# Patient Record
Sex: MALE | Race: WHITE | Employment: OTHER | URBAN - METROPOLITAN AREA
[De-identification: names, ages, dates, MRNs, and addresses within clinical notes are randomized per-mention and may not be internally consistent; named-entity substitution may affect disease eponyms.]

---

## 2017-05-11 PROCEDURE — 88305 TISSUE EXAM BY PATHOLOGIST: CPT | Performed by: INTERNAL MEDICINE

## 2017-05-18 ENCOUNTER — ANESTHESIA EVENT (OUTPATIENT)
Dept: SURGERY | Facility: HOSPITAL | Age: 62
End: 2017-05-18
Payer: COMMERCIAL

## 2017-05-18 ENCOUNTER — SURGERY (OUTPATIENT)
Age: 62
End: 2017-05-18

## 2017-05-18 ENCOUNTER — HOSPITAL ENCOUNTER (OUTPATIENT)
Facility: HOSPITAL | Age: 62
Setting detail: HOSPITAL OUTPATIENT SURGERY
Discharge: HOME | End: 2017-05-18
Attending: OTOLARYNGOLOGY | Admitting: OTOLARYNGOLOGY
Payer: COMMERCIAL

## 2017-05-18 ENCOUNTER — ANESTHESIA (OUTPATIENT)
Dept: SURGERY | Facility: HOSPITAL | Age: 62
End: 2017-05-18
Payer: COMMERCIAL

## 2017-05-18 VITALS
HEIGHT: 71 IN | SYSTOLIC BLOOD PRESSURE: 146 MMHG | RESPIRATION RATE: 16 BRPM | HEART RATE: 65 BPM | DIASTOLIC BLOOD PRESSURE: 75 MMHG | WEIGHT: 218 LBS | OXYGEN SATURATION: 97 % | BODY MASS INDEX: 30.52 KG/M2 | TEMPERATURE: 98 F

## 2017-05-18 DIAGNOSIS — H90.A31 MIXED CONDUCTIVE AND SENSORINEURAL HEARING LOSS OF RIGHT EAR WITH RESTRICTED HEARING OF LEFT EAR: ICD-10-CM

## 2017-05-18 DIAGNOSIS — H71.01 CHOLESTEATOMA OF ATTIC OF RIGHT EAR: ICD-10-CM

## 2017-05-18 PROBLEM — H90.71 MIXED HEARING LOSS OF RIGHT EAR: Chronic | Status: ACTIVE | Noted: 2017-05-18

## 2017-05-18 PROCEDURE — 88304 TISSUE EXAM BY PATHOLOGIST: CPT | Performed by: OTOLARYNGOLOGY

## 2017-05-18 PROCEDURE — 099500Z DRAINAGE OF RIGHT MIDDLE EAR WITH DRAINAGE DEVICE, OPEN APPROACH: ICD-10-PCS | Performed by: OTOLARYNGOLOGY

## 2017-05-18 PROCEDURE — 09R90JZ REPLACEMENT OF RIGHT AUDITORY OSSICLE WITH SYNTHETIC SUBSTITUTE, OPEN APPROACH: ICD-10-PCS | Performed by: OTOLARYNGOLOGY

## 2017-05-18 DEVICE — TUBE VENT RICHARDS 70241344: Type: IMPLANTABLE DEVICE | Site: EAR | Status: FUNCTIONAL

## 2017-05-18 RX ORDER — ONDANSETRON 2 MG/ML
INJECTION INTRAMUSCULAR; INTRAVENOUS
Status: COMPLETED
Start: 2017-05-18 | End: 2017-05-18

## 2017-05-18 RX ORDER — METOCLOPRAMIDE HYDROCHLORIDE 5 MG/ML
10 INJECTION INTRAMUSCULAR; INTRAVENOUS AS NEEDED
Status: DISCONTINUED | OUTPATIENT
Start: 2017-05-18 | End: 2017-05-18

## 2017-05-18 RX ORDER — ONDANSETRON 2 MG/ML
4 INJECTION INTRAMUSCULAR; INTRAVENOUS AS NEEDED
Status: DISCONTINUED | OUTPATIENT
Start: 2017-05-18 | End: 2017-05-18

## 2017-05-18 RX ORDER — NALOXONE HYDROCHLORIDE 0.4 MG/ML
80 INJECTION, SOLUTION INTRAMUSCULAR; INTRAVENOUS; SUBCUTANEOUS AS NEEDED
Status: DISCONTINUED | OUTPATIENT
Start: 2017-05-18 | End: 2017-05-18

## 2017-05-18 RX ORDER — HYDROCODONE BITARTRATE AND ACETAMINOPHEN 5; 325 MG/1; MG/1
1-2 TABLET ORAL EVERY 4 HOURS PRN
Qty: 40 TABLET | Refills: 0 | Status: SHIPPED | OUTPATIENT
Start: 2017-05-18 | End: 2017-12-19 | Stop reason: ALTCHOICE

## 2017-05-18 RX ORDER — HYDROMORPHONE HYDROCHLORIDE 1 MG/ML
0.4 INJECTION, SOLUTION INTRAMUSCULAR; INTRAVENOUS; SUBCUTANEOUS EVERY 5 MIN PRN
Status: DISCONTINUED | OUTPATIENT
Start: 2017-05-18 | End: 2017-05-18

## 2017-05-18 RX ORDER — DEXAMETHASONE SODIUM PHOSPHATE 4 MG/ML
4 VIAL (ML) INJECTION AS NEEDED
Status: DISCONTINUED | OUTPATIENT
Start: 2017-05-18 | End: 2017-05-18

## 2017-05-18 RX ORDER — SODIUM CHLORIDE, SODIUM LACTATE, POTASSIUM CHLORIDE, CALCIUM CHLORIDE 600; 310; 30; 20 MG/100ML; MG/100ML; MG/100ML; MG/100ML
INJECTION, SOLUTION INTRAVENOUS CONTINUOUS
Status: DISCONTINUED | OUTPATIENT
Start: 2017-05-18 | End: 2017-05-18

## 2017-05-18 RX ORDER — HYDRALAZINE HYDROCHLORIDE 20 MG/ML
5 INJECTION INTRAMUSCULAR; INTRAVENOUS EVERY 10 MIN PRN
Status: DISCONTINUED | OUTPATIENT
Start: 2017-05-18 | End: 2017-05-18

## 2017-05-18 RX ORDER — HYDROCODONE BITARTRATE AND ACETAMINOPHEN 5; 325 MG/1; MG/1
2 TABLET ORAL AS NEEDED
Status: COMPLETED | OUTPATIENT
Start: 2017-05-18 | End: 2017-05-18

## 2017-05-18 RX ORDER — IBUPROFEN 200 MG
400 TABLET ORAL EVERY 6 HOURS PRN
Qty: 30 TABLET | Refills: 0 | Status: SHIPPED | OUTPATIENT
Start: 2017-05-20 | End: 2017-12-19 | Stop reason: ALTCHOICE

## 2017-05-18 RX ORDER — METOCLOPRAMIDE HYDROCHLORIDE 5 MG/ML
INJECTION INTRAMUSCULAR; INTRAVENOUS
Status: COMPLETED
Start: 2017-05-18 | End: 2017-05-18

## 2017-05-18 RX ORDER — MEPERIDINE HYDROCHLORIDE 25 MG/ML
12.5 INJECTION INTRAMUSCULAR; INTRAVENOUS; SUBCUTANEOUS AS NEEDED
Status: DISCONTINUED | OUTPATIENT
Start: 2017-05-18 | End: 2017-05-18

## 2017-05-18 RX ORDER — MEPERIDINE HYDROCHLORIDE 25 MG/ML
INJECTION INTRAMUSCULAR; INTRAVENOUS; SUBCUTANEOUS
Status: COMPLETED
Start: 2017-05-18 | End: 2017-05-18

## 2017-05-18 RX ORDER — CIPROFLOXACIN AND DEXAMETHASONE 3; 1 MG/ML; MG/ML
SUSPENSION/ DROPS AURICULAR (OTIC) AS NEEDED
Status: DISCONTINUED | OUTPATIENT
Start: 2017-05-18 | End: 2017-05-18 | Stop reason: HOSPADM

## 2017-05-18 RX ORDER — HYDROMORPHONE HYDROCHLORIDE 1 MG/ML
INJECTION, SOLUTION INTRAMUSCULAR; INTRAVENOUS; SUBCUTANEOUS
Status: COMPLETED
Start: 2017-05-18 | End: 2017-05-18

## 2017-05-18 RX ORDER — LIDOCAINE HYDROCHLORIDE AND EPINEPHRINE 10; 10 MG/ML; UG/ML
INJECTION, SOLUTION INFILTRATION; PERINEURAL AS NEEDED
Status: DISCONTINUED | OUTPATIENT
Start: 2017-05-18 | End: 2017-05-18 | Stop reason: HOSPADM

## 2017-05-18 RX ORDER — HYDROCODONE BITARTRATE AND ACETAMINOPHEN 5; 325 MG/1; MG/1
1 TABLET ORAL AS NEEDED
Status: COMPLETED | OUTPATIENT
Start: 2017-05-18 | End: 2017-05-18

## 2017-05-18 RX ORDER — LABETALOL HYDROCHLORIDE 5 MG/ML
5 INJECTION, SOLUTION INTRAVENOUS EVERY 5 MIN PRN
Status: DISCONTINUED | OUTPATIENT
Start: 2017-05-18 | End: 2017-05-18

## 2017-05-18 NOTE — ANESTHESIA POSTPROCEDURE EVALUATION
[facility] Patient Status:  Hospital Outpatient Surgery   Age/Gender 59year old male MRN [id number]   [facility] SURGERY Attending Desiree Orlando MD   Hosp Day # 0 PCP Molly Ford MD       Anesthesia Post-op N

## 2017-05-18 NOTE — ANESTHESIA PREPROCEDURE EVALUATION
PRE-OP EVALUATION    Patient Name: Virginia Hermosillo    Pre-op Diagnosis: Cholesteatoma of attic of right ear [H71.01]  Mixed conductive and sensorineural hearing loss of right ear with restricted hearing of left ear [H90. A31]    Procedure(s):  Right tymp Neuro/Psych  Comment: Neuropathy                            Patient Active Problem List:     Erectile dysfunction     LBP radiating to left leg     Elevated cholesterol     BPH with obstruction/lower urinary tract symptoms     Primary localized osteoarthro dentition, multiple chipped/missing/carious teeth             Pulmonary    Pulmonary exam normal.  Breath sounds clear to auscultation bilaterally.     (-) rhonchi    (-) wheezes  (-) rales     Other findings            ASA: 2   Plan: general  NPO status ve

## 2017-05-18 NOTE — OPERATIVE REPORT
[facility]  Operative Report    Beba Harringotn Location: OR   CSN [id number] MRN [id number]   Admission Date 5/18/2017 Operation Date 5/18/2017   Attending Physician Jesse Noriega MD Operating Physician Stefan Ruggiero MD     Pre-Operative Joe Krause table. Anesthesia was administered via endotracheal intubation. The bed was turned 180 degrees and the patient was positioned with his right ear up.  The PrivacyProtector facial nerve monitor electrodes were placed and proper impedence and artifactual response wer stimulation. The incus was eroded with only a small superior remnant remaining which was removed. The posterior aspect of the cholesteatoma could not be identified so the decision was made to proceed with mastoidectomy.     A mastoidectomy was then complete procedure well and there were no apparent complications at the end of the procedure. Monique Head was transferred to the post anesthesia care unit in stable condition where his facial nerve was noted to be intact.      Karine Suggs  5/18/2017  10:32 AM

## 2017-05-18 NOTE — BRIEF OP NOTE
Pre-Operative Diagnosis: Cholesteatoma of attic of right ear [H71.01]  Mixed conductive and sensorineural hearing loss of right ear with restricted hearing of left ear [H90. A31]     Post-Operative Diagnosis: Cholesteatoma of attic of right ear [H71.01]M

## 2017-05-18 NOTE — INTERVAL H&P NOTE
Pre-op Diagnosis: Cholesteatoma of attic of right ear [H71.01]  Mixed conductive and sensorineural hearing loss of right ear with restricted hearing of left ear [H90. A31]    The above referenced H&P was reviewed by Justyn Reyes MD on 5/18/2017, the paul

## 2019-11-13 PROBLEM — S86.911A STRAIN OF BOTH KNEES: Status: ACTIVE | Noted: 2019-11-13

## 2019-11-13 PROBLEM — S86.912A STRAIN OF BOTH KNEES: Status: ACTIVE | Noted: 2019-11-13

## 2020-11-18 ENCOUNTER — HOSPITAL ENCOUNTER (INPATIENT)
Facility: HOSPITAL | Age: 65
LOS: 3 days | Discharge: HOME | DRG: 177 | End: 2020-11-22
Attending: STUDENT IN AN ORGANIZED HEALTH CARE EDUCATION/TRAINING PROGRAM | Admitting: INTERNAL MEDICINE
Payer: MEDICARE

## 2020-11-18 ENCOUNTER — APPOINTMENT (OUTPATIENT)
Dept: GENERAL RADIOLOGY | Facility: HOSPITAL | Age: 65
DRG: 177 | End: 2020-11-18
Attending: EMERGENCY MEDICINE
Payer: MEDICARE

## 2020-11-18 ENCOUNTER — APPOINTMENT (OUTPATIENT)
Dept: CT IMAGING | Facility: HOSPITAL | Age: 65
DRG: 177 | End: 2020-11-18
Attending: STUDENT IN AN ORGANIZED HEALTH CARE EDUCATION/TRAINING PROGRAM
Payer: MEDICARE

## 2020-11-18 DIAGNOSIS — U07.1 COVID-19 VIRUS INFECTION: Primary | ICD-10-CM

## 2020-11-18 DIAGNOSIS — R09.02 HYPOXIA: ICD-10-CM

## 2020-11-18 DIAGNOSIS — R52 BODY ACHES: ICD-10-CM

## 2020-11-18 DIAGNOSIS — R06.00 DYSPNEA, UNSPECIFIED TYPE: ICD-10-CM

## 2020-11-18 PROCEDURE — 83605 ASSAY OF LACTIC ACID: CPT

## 2020-11-18 PROCEDURE — 80053 COMPREHEN METABOLIC PANEL: CPT | Performed by: EMERGENCY MEDICINE

## 2020-11-18 PROCEDURE — 83880 ASSAY OF NATRIURETIC PEPTIDE: CPT | Performed by: EMERGENCY MEDICINE

## 2020-11-18 PROCEDURE — 99285 EMERGENCY DEPT VISIT HI MDM: CPT

## 2020-11-18 PROCEDURE — 87040 BLOOD CULTURE FOR BACTERIA: CPT

## 2020-11-18 PROCEDURE — 83605 ASSAY OF LACTIC ACID: CPT | Performed by: STUDENT IN AN ORGANIZED HEALTH CARE EDUCATION/TRAINING PROGRAM

## 2020-11-18 PROCEDURE — 3E0333Z INTRODUCTION OF ANTI-INFLAMMATORY INTO PERIPHERAL VEIN, PERCUTANEOUS APPROACH: ICD-10-PCS | Performed by: STUDENT IN AN ORGANIZED HEALTH CARE EDUCATION/TRAINING PROGRAM

## 2020-11-18 PROCEDURE — 81001 URINALYSIS AUTO W/SCOPE: CPT | Performed by: EMERGENCY MEDICINE

## 2020-11-18 PROCEDURE — 71275 CT ANGIOGRAPHY CHEST: CPT | Performed by: STUDENT IN AN ORGANIZED HEALTH CARE EDUCATION/TRAINING PROGRAM

## 2020-11-18 PROCEDURE — 93010 ELECTROCARDIOGRAM REPORT: CPT

## 2020-11-18 PROCEDURE — 84484 ASSAY OF TROPONIN QUANT: CPT | Performed by: EMERGENCY MEDICINE

## 2020-11-18 PROCEDURE — 84145 PROCALCITONIN (PCT): CPT | Performed by: EMERGENCY MEDICINE

## 2020-11-18 PROCEDURE — 71045 X-RAY EXAM CHEST 1 VIEW: CPT | Performed by: EMERGENCY MEDICINE

## 2020-11-18 PROCEDURE — 87040 BLOOD CULTURE FOR BACTERIA: CPT | Performed by: STUDENT IN AN ORGANIZED HEALTH CARE EDUCATION/TRAINING PROGRAM

## 2020-11-18 PROCEDURE — 96374 THER/PROPH/DIAG INJ IV PUSH: CPT

## 2020-11-18 PROCEDURE — 85025 COMPLETE CBC W/AUTO DIFF WBC: CPT | Performed by: EMERGENCY MEDICINE

## 2020-11-18 PROCEDURE — 85379 FIBRIN DEGRADATION QUANT: CPT | Performed by: EMERGENCY MEDICINE

## 2020-11-18 PROCEDURE — 36415 COLL VENOUS BLD VENIPUNCTURE: CPT

## 2020-11-18 PROCEDURE — 93005 ELECTROCARDIOGRAM TRACING: CPT

## 2020-11-18 RX ORDER — DEXAMETHASONE SODIUM PHOSPHATE 4 MG/ML
6 VIAL (ML) INJECTION ONCE
Status: COMPLETED | OUTPATIENT
Start: 2020-11-18 | End: 2020-11-18

## 2020-11-19 PROBLEM — R09.02 HYPOXIA: Status: ACTIVE | Noted: 2020-11-19

## 2020-11-19 PROBLEM — R52 BODY ACHES: Status: ACTIVE | Noted: 2020-11-19

## 2020-11-19 PROBLEM — R06.00 DYSPNEA, UNSPECIFIED TYPE: Status: ACTIVE | Noted: 2020-11-19

## 2020-11-19 PROCEDURE — 83615 LACTATE (LD) (LDH) ENZYME: CPT | Performed by: HOSPITALIST

## 2020-11-19 PROCEDURE — 86901 BLOOD TYPING SEROLOGIC RH(D): CPT | Performed by: INTERNAL MEDICINE

## 2020-11-19 PROCEDURE — 85379 FIBRIN DEGRADATION QUANT: CPT | Performed by: HOSPITALIST

## 2020-11-19 PROCEDURE — XW13325 TRANSFUSION OF CONVALESCENT PLASMA (NONAUTOLOGOUS) INTO PERIPHERAL VEIN, PERCUTANEOUS APPROACH, NEW TECHNOLOGY GROUP 5: ICD-10-PCS | Performed by: INTERNAL MEDICINE

## 2020-11-19 PROCEDURE — 86850 RBC ANTIBODY SCREEN: CPT | Performed by: INTERNAL MEDICINE

## 2020-11-19 PROCEDURE — 86900 BLOOD TYPING SEROLOGIC ABO: CPT | Performed by: INTERNAL MEDICINE

## 2020-11-19 PROCEDURE — 86927 PLASMA FRESH FROZEN: CPT

## 2020-11-19 PROCEDURE — 82728 ASSAY OF FERRITIN: CPT | Performed by: HOSPITALIST

## 2020-11-19 PROCEDURE — 85025 COMPLETE CBC W/AUTO DIFF WBC: CPT | Performed by: INTERNAL MEDICINE

## 2020-11-19 PROCEDURE — 36430 TRANSFUSION BLD/BLD COMPNT: CPT

## 2020-11-19 PROCEDURE — 86140 C-REACTIVE PROTEIN: CPT | Performed by: HOSPITALIST

## 2020-11-19 PROCEDURE — XW033E5 INTRODUCTION OF REMDESIVIR ANTI-INFECTIVE INTO PERIPHERAL VEIN, PERCUTANEOUS APPROACH, NEW TECHNOLOGY GROUP 5: ICD-10-PCS | Performed by: INTERNAL MEDICINE

## 2020-11-19 RX ORDER — ONDANSETRON 2 MG/ML
4 INJECTION INTRAMUSCULAR; INTRAVENOUS EVERY 6 HOURS PRN
Status: DISCONTINUED | OUTPATIENT
Start: 2020-11-19 | End: 2020-11-22

## 2020-11-19 RX ORDER — ENOXAPARIN SODIUM 100 MG/ML
40 INJECTION SUBCUTANEOUS DAILY
Status: DISCONTINUED | OUTPATIENT
Start: 2020-11-19 | End: 2020-11-22

## 2020-11-19 RX ORDER — SODIUM CHLORIDE 9 MG/ML
INJECTION, SOLUTION INTRAVENOUS ONCE
Status: COMPLETED | OUTPATIENT
Start: 2020-11-19 | End: 2020-11-19

## 2020-11-19 RX ORDER — METOCLOPRAMIDE HYDROCHLORIDE 5 MG/ML
10 INJECTION INTRAMUSCULAR; INTRAVENOUS EVERY 8 HOURS PRN
Status: DISCONTINUED | OUTPATIENT
Start: 2020-11-19 | End: 2020-11-22

## 2020-11-19 RX ORDER — FAMOTIDINE 20 MG/1
40 TABLET ORAL DAILY
Status: DISCONTINUED | OUTPATIENT
Start: 2020-11-19 | End: 2020-11-22

## 2020-11-19 RX ORDER — LISINOPRIL 10 MG/1
10 TABLET ORAL DAILY
Status: DISCONTINUED | OUTPATIENT
Start: 2020-11-19 | End: 2020-11-22

## 2020-11-19 RX ORDER — ACETAMINOPHEN 325 MG/1
650 TABLET ORAL EVERY 6 HOURS PRN
Status: DISCONTINUED | OUTPATIENT
Start: 2020-11-19 | End: 2020-11-22

## 2020-11-19 NOTE — PLAN OF CARE
Pt is aaox4, denies pain, received on O2 2l/nc, O2sat 96-98%, weaned to RA, O2sat 92-94%, states slight SOB with ambulation, afebrile, maintained on isol.   Pt remains on RA, O2sat 94%, still with SOB on ambulation, with new order for remdesivir and CCP, ex

## 2020-11-19 NOTE — CONSULTS
INFECTIOUS DISEASE CONSULT NOTE    Selina Harrington Patient Status:  Inpatient    1955 MRN [id number]   [facility] 4NW-A Attending Tessy Wood, [facility] Day # 0 PCP Bear Carranza MD       Reason for Consultation:  covid 19 in left   • KNEE REPLACEMENT SURGERY  12/5/13    Right TKA by Dr. Maddi Do   • [facility] Right 12/5/2013    Performed by Aileen Peres MD at [facility]   • [address] [facility] Left 10/24/2013    Performed by Aileen Peres MD at [facility] Radha Gruber pain  : Negative for dysuria, hematuria, frequency or flank pain  Integument: Negative for rash, skin lesions, pruritus. Hematologic/lymphatic: Negative for easy bruising, bleeding  Musculoskeletal: Negative for arthritis.   Neurological: Negative for fo (A) 11/11/2020       Pro-Calcitonin  Recent Labs   Lab 11/18/20  2143   PCT <0.05       Cardiac  Recent Labs   Lab 11/18/20  1143 11/18/20  2143   TROP <0.300 <0.045   PBNP  --  576*       Creatinine Kinase  Recent Labs   Lab 11/16/20  1009 11/18/20  0930 than 25% of lung parenchyma. PLEURA: No pleural effusions. No pneumothoraces. OSSEOUS STRUCTURES: No radiographically identifiable skeletal lesions. ABDOMEN: The visualized upper abdomen is grossly unremarkable. IMPRESSION: 1.  Mild multifocal groundglas nodes.  A more prominent on the right is measured at 1.8 x 1.1 cm. A more prominent on the left is measured at 1.5 x 1.1 cm. CARDIAC:  No pericardial effusion. PLEURA:  No pleural effusion. CHEST WALL:  No axillary adenopathy.  LIMITED ABDOMEN:  No abnorma extremity, as detailed above. 2. Rouleaux formation and flow seen within the central aspects of the superficial femoral vein. 3. Reactive inguinal nodes.     Ct Abdomen+pelvis(contrast Only)(cpt=74177)    Result Date: 11/18/2020  DATE OF SERVICE: 11.18.2020 noted. LYMPH NODES:  There is no abdominal or pelvic adenopathy. BODY WALL/OSSEOUS STRUCTURES:  Multilevel degenerative disc disease is noted. Stable lumbosacral fixation with interbody grafting is noted. No hardware complication is identified.     FAVIOLA exposure control and ALARA manual techniques for patient specific dose reduction were followed while maintaining the necessary diagnostic image quality. COMPARISON STUDY: CT chest dated 11/11/2020. CT abdomen and pelvis dated 3/16/2012.  FINDINGS: PULMONARY asymmetrically low-density focus within the anterior aspect of the right renal lower pole measuring 2.1 x 1.7 cm in axial diameter and 1.7 cm in craniocaudal diameter. This was not present on the prior comparison CT.  There is a 2 mm nonobstructing stone wi Consensus Statement on Reporting Chest CT Findings Related to COVID-19. Endorsed by the Society of Thoracic Radiology, the [employer] of Radiology, and Radiological Society of [place]       ASSESSMENT/ PLAN:    1.  Covid 19 infection with pna  -

## 2020-11-19 NOTE — ED PROVIDER NOTES
Patient Seen in: [facility] Emergency Department      History   Patient presents with:  Difficulty Breathing    Stated Complaint: covid + sob     HPI    Patient is a 58-year-old male with COVID-19 infection who presents to the emergency department t Right 5/18/2017    Performed by Tatyana Heart MD at [facility] MAIN OR   • EAR TYMPANOMASTOIDECTOMY Right 5/18/2017    Performed by Tatyana Heart MD at [facility] MAIN OR   • EAR TYMPANOPLASTY Right 5/18/2017    Performed by Tatyana Heart MD at [place] [address] Normocephalic and atraumatic. Eyes: Pupils are equal, round, and reactive to light. Neck: Normal range of motion. Neck supple. Cardiovascular: Normal rate, regular rhythm, normal heart sounds and intact distal pulses.   Exam reveals no gallop and no f DIFFERENTIAL WITH PLATELET.   Procedure                               Abnormality         Status                     ---------                               -----------         ------                     CBC W/ DIFFERENTIAL[[id number]]          Abnormal

## 2020-11-19 NOTE — H&P
[facility] Hospitalist Team  History and Physical       Assessment/Plan:     #Covid -19 PNA  -Steroids ordered as pt was hypoxic but now on RA  -ID consulted  -monitor inflammatory labs  -CT neg for PE, no evidence for bacterial PNA    #Acute hypoxic respiratory Right 5/18/2017    Performed by Reza Golden MD at [facility] MAIN OR   • EAR TYMPANOMASTOIDECTOMY Right 5/18/2017    Performed by Reza Golden MD at [facility] MAIN OR   • EAR TYMPANOPLASTY Right 5/18/2017    Performed by Reza Golden MD at [place] [address] (-) disorder of thought or mood  ENDOCRINE:  (-) heat or cold intolerance (-) polyuria (-) polydipsia  HEMATOLOGICAL:  (-) easy bruising (-) bleeding    OBJECTIVE:  /59 (BP Location: Right arm)   Pulse 69   Temp (!) 96.4 °F (35.8 °C) (Oral)   Resp 20 images of the chest were performed from the thoracic inlet through the adrenal glands without infusion of IV contrast, utilizing low-dose (LD) technique. The data was reconstructed into 1.25 mm collimated axial images.  Automated exposure control and ALARA College of Radiology, and Radiological Society of [place]. 2. Atherosclerotic changes of the thoracic aorta.       Ct Angiography, Chest (cpt=71275)    Result Date: 11/18/2020  PROCEDURE:  CT ANGIOGRAPHY, CHEST (CPT=71275)  COMPARISON:  RAGHU MANRIQUE, X MD on 11/18/2020 at 11:21 PM       Us Venous Doppler Leg Right - Diag Img (cpt=93971)    Result Date: 11/16/2020  DATE OF SERVICE: 11.16.2020 US VENOUS DOPPLER LEG RIGHT - DIAG IMG (CPT=93971) CLINICAL INDICATION:  Right calf pain.  COMPARISON:  None availa chest, abdomen and pelvis dated 11/16/2020 FINDINGS: LUNG BASES:  There is moderate peripheral nodular airspace disease compatible with Covid-19 similar to prior examination. The heart is stable in size. No pericardial effusion is identified.  LIVER, SPLEEN transcribed by Technologist)  Patient is COVID positive with worsening symptoms. FINDINGS:  Lung volumes are low normal.  Nonspecific elevation of the right diaphragm. There are mild patchy opacities at both lung bases. No definite pleural effusion. is in the right paratracheal station measuring 10 mm in short axis diameter. There are mildly enlarged hilar lymph nodes bilaterally measuring up to 13 x 9 mm on the right and 16 x 11 mm on the left. There is no significant axillary lymphadenopathy.  There 3.4 x 2.4 cm on the left. OSSEOUS STRUCTURES: 4 status post L4-S1 fusion with interbody spacer placements. Multilevel degenerative spondylosis. No destructive osseous abnormality. IMPRESSION: 1.  Redemonstrated multifocal groundglass pulmonary opacities, after discharge, patient will require TBD.

## 2020-11-20 PROCEDURE — 85379 FIBRIN DEGRADATION QUANT: CPT | Performed by: HOSPITALIST

## 2020-11-20 PROCEDURE — 86140 C-REACTIVE PROTEIN: CPT | Performed by: HOSPITALIST

## 2020-11-20 PROCEDURE — 82728 ASSAY OF FERRITIN: CPT | Performed by: HOSPITALIST

## 2020-11-20 PROCEDURE — 83615 LACTATE (LD) (LDH) ENZYME: CPT | Performed by: HOSPITALIST

## 2020-11-20 PROCEDURE — 80053 COMPREHEN METABOLIC PANEL: CPT | Performed by: INTERNAL MEDICINE

## 2020-11-20 RX ORDER — SODIUM CHLORIDE/ALOE VERA
GEL (GRAM) NASAL AS NEEDED
Status: DISCONTINUED | OUTPATIENT
Start: 2020-11-20 | End: 2020-11-22

## 2020-11-20 NOTE — PROGRESS NOTES
[facility] Hospitalist Team  Progress Note      Nely Harrington  7/2/1955    Assessment/Plan:       #Covid -19 PNA  -Steroids ordered   -ID consulted  -monitor inflammatory labs  -CT neg for PE, no evidence for bacterial PNA  -remdesivir ordered    #Acute hypo Oral Daily   • lisinopril  10 mg Oral Daily   • remdesivir IVPB  100 mg Intravenous Q24H     Continuous Infusions:   PRN: sodium chloride 0.9%, acetaminophen, ondansetron HCl, Metoclopramide HCl       Principal Problem:    COVID-19 virus infection  Active

## 2020-11-20 NOTE — PROGRESS NOTES
INFECTIOUS DISEASE PROGRESS NOTE    Lisset Villafuerte Len Patient Status:  Inpatient    1955 MRN [id number]   [facility] 4NW-A Attending Erik Humphreys, [facility] Day # 1 PCP MD FRANCI Figueroa D#2 CCP   DEXA    Subj --  112 103   GFRNAA  --  97 89   CA  --  8.3* 9.0   ALB 3.7 2.8* 2.9*    133* 137   K 3.80 3.7 3.1*   CL 99 101 105   CO2 25.7 24.0 28.0   ALKPHO 54 44* 49   AST 52* 49* 43*   ALT 19 22 25   BILT 0.44 0.5 0.4   TP 7.5 7.1 7.4     No results found fo follow    Cristiane Alfonso

## 2020-11-20 NOTE — PLAN OF CARE
Pt A/O x4. Vitals stable. Afebrile. No c/o pain. Received pt on RA, saturations in mid to high 90's. Pt c/o slight dyspnea on exertion. Pt received day 1 of Remdesivir and convalescent plasma. Pt tolerated well. Non-productive cough.  Ambulatory to bathroom

## 2020-11-20 NOTE — CM/SW NOTE
Patient discussed during rounds. Patient is currently on 2L02 from room air yesterday. No dc today. SW will watch for 02 needs.     Kathy Magana LCSW

## 2020-11-20 NOTE — PLAN OF CARE
Assumed patient care at 7:30  A/O x4  2L (will continue to try and wean the patient O2)  NSR on tele   Voids per bathroom  No pain noted by the patient  Regular diet   Right AC SL   Electrolyte protocol  All safety precautions are in place and will continu

## 2020-11-21 PROCEDURE — 86140 C-REACTIVE PROTEIN: CPT | Performed by: HOSPITALIST

## 2020-11-21 PROCEDURE — 83615 LACTATE (LD) (LDH) ENZYME: CPT | Performed by: HOSPITALIST

## 2020-11-21 PROCEDURE — 85025 COMPLETE CBC W/AUTO DIFF WBC: CPT | Performed by: INTERNAL MEDICINE

## 2020-11-21 PROCEDURE — 85379 FIBRIN DEGRADATION QUANT: CPT | Performed by: HOSPITALIST

## 2020-11-21 PROCEDURE — 82728 ASSAY OF FERRITIN: CPT | Performed by: HOSPITALIST

## 2020-11-21 PROCEDURE — 80053 COMPREHEN METABOLIC PANEL: CPT | Performed by: INTERNAL MEDICINE

## 2020-11-21 NOTE — PLAN OF CARE
.. COVID Note    Subjective:  Bard Flattery is currently Confirmed for COVID-19.   Presenting symptoms: cough and shortness of breath  he denies     Max temperature in last 24 hours: Temp (24hrs), Av.9 °F (36.1 °C), Min:93.9 °F (34.4 °C), Max:98.3 °

## 2020-11-21 NOTE — PROGRESS NOTES
INFECTIOUS DISEASE PROGRESS NOTE    Tegan Harrington Patient Status:  Inpatient    1955 MRN [id number]   [facility] 4NW-A Attending Lacy Luque, [facility] Day # 2 PCP MD FRANCI Colindres D#3 CCP   DEXA    Subj 92   CA 8.3* 9.0 9.4   ALB 2.8* 2.9* 2.9*   * 137 137   K 3.7 3.1* 3.7    105 104   CO2 24.0 28.0 28.0   ALKPHO 44* 49 46   AST 49* 43* 42*   ALT 22 25 27   BILT 0.5 0.4 0.4   TP 7.1 7.4 7.4     No results found for: ESRML   C-REACTIVE PROTEIN, remdesivir for today, if he remains off o2 and O2 with ambulation Ok, may be able to go home tomorrow to complete 10 day course of dexa.     Ronit Rand

## 2020-11-21 NOTE — PROGRESS NOTES
[facility] Hospitalist Team  Progress Note      Nely Harrington  7/2/1955    Assessment/Plan:       #Covid -19 PNA  -Steroids ordered   -ID consulted  -monitor inflammatory labs  -CT neg for PE, no evidence for bacterial PNA  -remdesivir day 3    #Acute hypoxi PGLU in the last 168 hours.     Meds:   Scheduled:   • enoxaparin  40 mg Subcutaneous Daily   • dexamethasone  6 mg Oral Daily   • famotidine  40 mg Oral Daily   • lisinopril  10 mg Oral Daily   • remdesivir IVPB  100 mg Intravenous Q24H     Continuous Infu

## 2020-11-22 VITALS
DIASTOLIC BLOOD PRESSURE: 76 MMHG | BODY MASS INDEX: 29.64 KG/M2 | HEIGHT: 71 IN | TEMPERATURE: 98 F | OXYGEN SATURATION: 95 % | SYSTOLIC BLOOD PRESSURE: 131 MMHG | RESPIRATION RATE: 24 BRPM | WEIGHT: 211.69 LBS | HEART RATE: 67 BPM

## 2020-11-22 PROCEDURE — 80053 COMPREHEN METABOLIC PANEL: CPT | Performed by: INTERNAL MEDICINE

## 2020-11-22 PROCEDURE — 85025 COMPLETE CBC W/AUTO DIFF WBC: CPT | Performed by: INTERNAL MEDICINE

## 2020-11-22 RX ORDER — DEXAMETHASONE 6 MG/1
6 TABLET ORAL DAILY
Qty: 6 TABLET | Refills: 0 | Status: SHIPPED | OUTPATIENT
Start: 2020-11-23 | End: 2020-12-17

## 2020-11-22 NOTE — PLAN OF CARE
NURSING DISCHARGE NOTE    Discharged Home via Wheelchair. Accompanied by Support staff  Belongings Taken by patient/family. Tele removed. PIV removed. Discharge paper work reviewed with patient, patient verbalized understanding.

## 2020-11-22 NOTE — DISCHARGE SUMMARY
General Medicine Discharge Summary     Patient ID:  Mikaela Harrington  77year old  7/2/1955    Admit date: 11/18/2020    Discharge date and time: 11/22/20    Attending Physician: Tano Buckner, [place]) Oral Cap  Take 1 tablet by mouth daily. Sildenafil Citrate 100 MG Oral Tab  Take 100 mg by mouth daily as needed for Erectile Dysfunction. atorvastatin 20 MG Oral Tab  Take 20 mg by mouth nightly.     psyllium (METAMUCIL SMOOTH TEXTURE) 28 % Oral

## 2020-11-22 NOTE — COVID NURSING ASSESSMENT
COVID-19 Daily Discharge Readiness-Nursing    O2 Sat at Rest:     %   O2 Sat with Exertion:    % on    liters   Temperature max from last 24 hrs: Temp (24hrs), Av °F (36.7 °C), Min:97.6 °F (36.4 °C), Max:98.6 °F (37 °C)    Inflammatory Markers:   Recen

## 2021-01-08 RX ORDER — ACETAMINOPHEN 500 MG
1000 TABLET ORAL ONCE
Status: CANCELLED | OUTPATIENT
Start: 2021-01-08 | End: 2021-01-08

## 2021-01-08 RX ORDER — MULTIVIT WITH MINERALS/LUTEIN
1000 TABLET ORAL DAILY
COMMUNITY

## 2021-01-08 RX ORDER — CHOLECALCIFEROL (VITAMIN D3) 125 MCG
CAPSULE ORAL DAILY
COMMUNITY

## 2021-01-09 ENCOUNTER — HOSPITAL ENCOUNTER (OUTPATIENT)
Dept: GENERAL RADIOLOGY | Age: 66
Discharge: HOME | End: 2021-01-09
Payer: MEDICARE

## 2021-01-09 DIAGNOSIS — K40.90 INGUINAL HERNIA OF LEFT SIDE WITHOUT OBSTRUCTION OR GANGRENE: ICD-10-CM

## 2021-01-09 PROCEDURE — 71046 X-RAY EXAM CHEST 2 VIEWS: CPT

## 2021-01-12 ENCOUNTER — ANESTHESIA EVENT (OUTPATIENT)
Dept: SURGERY | Facility: HOSPITAL | Age: 66
End: 2021-01-12
Payer: MEDICARE

## 2021-01-21 ENCOUNTER — ANESTHESIA (OUTPATIENT)
Dept: SURGERY | Facility: HOSPITAL | Age: 66
End: 2021-01-21
Payer: MEDICARE

## 2021-01-21 ENCOUNTER — HOSPITAL ENCOUNTER (OUTPATIENT)
Facility: HOSPITAL | Age: 66
Setting detail: HOSPITAL OUTPATIENT SURGERY
Discharge: HOME | End: 2021-01-21
Attending: SURGERY | Admitting: SURGERY
Payer: MEDICARE

## 2021-01-21 VITALS
SYSTOLIC BLOOD PRESSURE: 115 MMHG | RESPIRATION RATE: 18 BRPM | BODY MASS INDEX: 31.18 KG/M2 | WEIGHT: 222.69 LBS | TEMPERATURE: 97 F | HEIGHT: 71 IN | DIASTOLIC BLOOD PRESSURE: 64 MMHG | OXYGEN SATURATION: 95 % | HEART RATE: 72 BPM

## 2021-01-21 DIAGNOSIS — K40.90 INGUINAL HERNIA OF LEFT SIDE WITHOUT OBSTRUCTION OR GANGRENE: Primary | ICD-10-CM

## 2021-01-21 DIAGNOSIS — K40.90 INGUINAL HERNIA, RIGHT: ICD-10-CM

## 2021-01-21 PROCEDURE — 0YU54JZ SUPPLEMENT RIGHT INGUINAL REGION WITH SYNTHETIC SUBSTITUTE, PERCUTANEOUS ENDOSCOPIC APPROACH: ICD-10-PCS | Performed by: SURGERY

## 2021-01-21 PROCEDURE — 8E0W4CZ ROBOTIC ASSISTED PROCEDURE OF TRUNK REGION, PERCUTANEOUS ENDOSCOPIC APPROACH: ICD-10-PCS | Performed by: SURGERY

## 2021-01-21 DEVICE — PROGRIP MESH: Type: IMPLANTABLE DEVICE | Site: INGUINAL | Status: FUNCTIONAL

## 2021-01-21 RX ORDER — ROCURONIUM BROMIDE 10 MG/ML
INJECTION, SOLUTION INTRAVENOUS AS NEEDED
Status: DISCONTINUED | OUTPATIENT
Start: 2021-01-21 | End: 2021-01-21 | Stop reason: SURG

## 2021-01-21 RX ORDER — LIDOCAINE HYDROCHLORIDE 10 MG/ML
INJECTION, SOLUTION INFILTRATION; PERINEURAL AS NEEDED
Status: DISCONTINUED | OUTPATIENT
Start: 2021-01-21 | End: 2021-01-21 | Stop reason: HOSPADM

## 2021-01-21 RX ORDER — GLYCOPYRROLATE 0.2 MG/ML
INJECTION, SOLUTION INTRAMUSCULAR; INTRAVENOUS AS NEEDED
Status: DISCONTINUED | OUTPATIENT
Start: 2021-01-21 | End: 2021-01-21 | Stop reason: SURG

## 2021-01-21 RX ORDER — NALOXONE HYDROCHLORIDE 0.4 MG/ML
80 INJECTION, SOLUTION INTRAMUSCULAR; INTRAVENOUS; SUBCUTANEOUS AS NEEDED
Status: DISCONTINUED | OUTPATIENT
Start: 2021-01-21 | End: 2021-01-21

## 2021-01-21 RX ORDER — HYDROCODONE BITARTRATE AND ACETAMINOPHEN 5; 325 MG/1; MG/1
1 TABLET ORAL AS NEEDED
Status: COMPLETED | OUTPATIENT
Start: 2021-01-21 | End: 2021-01-21

## 2021-01-21 RX ORDER — ONDANSETRON 2 MG/ML
4 INJECTION INTRAMUSCULAR; INTRAVENOUS AS NEEDED
Status: DISCONTINUED | OUTPATIENT
Start: 2021-01-21 | End: 2021-01-21

## 2021-01-21 RX ORDER — BUPIVACAINE HYDROCHLORIDE 5 MG/ML
INJECTION, SOLUTION EPIDURAL; INTRACAUDAL AS NEEDED
Status: DISCONTINUED | OUTPATIENT
Start: 2021-01-21 | End: 2021-01-21 | Stop reason: HOSPADM

## 2021-01-21 RX ORDER — SODIUM CHLORIDE, SODIUM LACTATE, POTASSIUM CHLORIDE, CALCIUM CHLORIDE 600; 310; 30; 20 MG/100ML; MG/100ML; MG/100ML; MG/100ML
INJECTION, SOLUTION INTRAVENOUS CONTINUOUS
Status: DISCONTINUED | OUTPATIENT
Start: 2021-01-21 | End: 2021-01-21

## 2021-01-21 RX ORDER — HYDROCODONE BITARTRATE AND ACETAMINOPHEN 5; 325 MG/1; MG/1
2 TABLET ORAL AS NEEDED
Status: COMPLETED | OUTPATIENT
Start: 2021-01-21 | End: 2021-01-21

## 2021-01-21 RX ORDER — IBUPROFEN 800 MG/1
800 TABLET ORAL EVERY 8 HOURS PRN
Qty: 24 TABLET | Refills: 0 | Status: SHIPPED | OUTPATIENT
Start: 2021-01-21 | End: 2021-03-22

## 2021-01-21 RX ORDER — METOCLOPRAMIDE HYDROCHLORIDE 5 MG/ML
10 INJECTION INTRAMUSCULAR; INTRAVENOUS AS NEEDED
Status: DISCONTINUED | OUTPATIENT
Start: 2021-01-21 | End: 2021-01-21

## 2021-01-21 RX ORDER — DEXAMETHASONE SODIUM PHOSPHATE 4 MG/ML
VIAL (ML) INJECTION AS NEEDED
Status: DISCONTINUED | OUTPATIENT
Start: 2021-01-21 | End: 2021-01-21 | Stop reason: SURG

## 2021-01-21 RX ORDER — HYDROMORPHONE HYDROCHLORIDE 1 MG/ML
INJECTION, SOLUTION INTRAMUSCULAR; INTRAVENOUS; SUBCUTANEOUS
Status: COMPLETED
Start: 2021-01-21 | End: 2021-01-21

## 2021-01-21 RX ORDER — ACETAMINOPHEN 500 MG
1000 TABLET ORAL ONCE AS NEEDED
Status: DISCONTINUED | OUTPATIENT
Start: 2021-01-21 | End: 2021-01-21

## 2021-01-21 RX ORDER — EPHEDRINE SULFATE 50 MG/ML
INJECTION INTRAVENOUS AS NEEDED
Status: DISCONTINUED | OUTPATIENT
Start: 2021-01-21 | End: 2021-01-21 | Stop reason: SURG

## 2021-01-21 RX ORDER — MIDAZOLAM HYDROCHLORIDE 1 MG/ML
INJECTION INTRAMUSCULAR; INTRAVENOUS AS NEEDED
Status: DISCONTINUED | OUTPATIENT
Start: 2021-01-21 | End: 2021-01-21 | Stop reason: SURG

## 2021-01-21 RX ORDER — ONDANSETRON 2 MG/ML
INJECTION INTRAMUSCULAR; INTRAVENOUS AS NEEDED
Status: DISCONTINUED | OUTPATIENT
Start: 2021-01-21 | End: 2021-01-21 | Stop reason: SURG

## 2021-01-21 RX ORDER — CEFAZOLIN SODIUM/WATER 2 G/20 ML
2 SYRINGE (ML) INTRAVENOUS ONCE
Status: COMPLETED | OUTPATIENT
Start: 2021-01-21 | End: 2021-01-21

## 2021-01-21 RX ORDER — KETOROLAC TROMETHAMINE 30 MG/ML
INJECTION, SOLUTION INTRAMUSCULAR; INTRAVENOUS AS NEEDED
Status: DISCONTINUED | OUTPATIENT
Start: 2021-01-21 | End: 2021-01-21 | Stop reason: SURG

## 2021-01-21 RX ORDER — NEOSTIGMINE METHYLSULFATE 1 MG/ML
INJECTION INTRAVENOUS AS NEEDED
Status: DISCONTINUED | OUTPATIENT
Start: 2021-01-21 | End: 2021-01-21 | Stop reason: SURG

## 2021-01-21 RX ORDER — ACETAMINOPHEN 500 MG
1000 TABLET ORAL ONCE
COMMUNITY
End: 2021-07-22 | Stop reason: ALTCHOICE

## 2021-01-21 RX ORDER — LIDOCAINE HYDROCHLORIDE 10 MG/ML
INJECTION, SOLUTION EPIDURAL; INFILTRATION; INTRACAUDAL; PERINEURAL AS NEEDED
Status: DISCONTINUED | OUTPATIENT
Start: 2021-01-21 | End: 2021-01-21 | Stop reason: SURG

## 2021-01-21 RX ORDER — HYDROMORPHONE HYDROCHLORIDE 1 MG/ML
0.4 INJECTION, SOLUTION INTRAMUSCULAR; INTRAVENOUS; SUBCUTANEOUS EVERY 5 MIN PRN
Status: DISCONTINUED | OUTPATIENT
Start: 2021-01-21 | End: 2021-01-21

## 2021-01-21 RX ADMIN — NEOSTIGMINE METHYLSULFATE 5 MG: 1 INJECTION INTRAVENOUS at 08:54:00

## 2021-01-21 RX ADMIN — CEFAZOLIN SODIUM/WATER 2 G: 2 G/20 ML SYRINGE (ML) INTRAVENOUS at 07:52:00

## 2021-01-21 RX ADMIN — EPHEDRINE SULFATE 5 MG: 50 INJECTION INTRAVENOUS at 08:20:00

## 2021-01-21 RX ADMIN — DEXAMETHASONE SODIUM PHOSPHATE 4 MG: 4 MG/ML VIAL (ML) INJECTION at 07:44:00

## 2021-01-21 RX ADMIN — EPHEDRINE SULFATE 5 MG: 50 INJECTION INTRAVENOUS at 08:25:00

## 2021-01-21 RX ADMIN — SODIUM CHLORIDE, SODIUM LACTATE, POTASSIUM CHLORIDE, CALCIUM CHLORIDE: 600; 310; 30; 20 INJECTION, SOLUTION INTRAVENOUS at 08:34:00

## 2021-01-21 RX ADMIN — ROCURONIUM BROMIDE 50 MG: 10 INJECTION, SOLUTION INTRAVENOUS at 07:44:00

## 2021-01-21 RX ADMIN — LIDOCAINE HYDROCHLORIDE 50 MG: 10 INJECTION, SOLUTION EPIDURAL; INFILTRATION; INTRACAUDAL; PERINEURAL at 07:44:00

## 2021-01-21 RX ADMIN — MIDAZOLAM HYDROCHLORIDE 2 MG: 1 INJECTION INTRAMUSCULAR; INTRAVENOUS at 07:43:00

## 2021-01-21 RX ADMIN — GLYCOPYRROLATE 0.8 MG: 0.2 INJECTION, SOLUTION INTRAMUSCULAR; INTRAVENOUS at 08:54:00

## 2021-01-21 RX ADMIN — ONDANSETRON 4 MG: 2 INJECTION INTRAMUSCULAR; INTRAVENOUS at 08:52:00

## 2021-01-21 RX ADMIN — SODIUM CHLORIDE, SODIUM LACTATE, POTASSIUM CHLORIDE, CALCIUM CHLORIDE: 600; 310; 30; 20 INJECTION, SOLUTION INTRAVENOUS at 09:01:00

## 2021-01-21 RX ADMIN — KETOROLAC TROMETHAMINE 30 MG: 30 INJECTION, SOLUTION INTRAMUSCULAR; INTRAVENOUS at 08:52:00

## 2021-01-21 NOTE — ANESTHESIA PROCEDURE NOTES
Airway  Date/Time: 1/21/2021 7:46 AM  Urgency: elective    Airway not difficult    General Information and Staff    Patient location during procedure: OR  Anesthesiologist: Gris Cool MD  Resident/CRNA: Gilbert Hewitt CRNA  Performed: CRNA     Ind

## 2021-01-21 NOTE — H&P
Renata Nelson is a 77year old male. Patient was referred from No ref. provider found       Patient presents for complaints of pain and a bulge in the RIGHT inguinal region. he recently underwent a CT scan of the abdomen which confirmed this herni MAIN OR   • KNEE REPLACEMENT SURGERY  10/2013    left   • KNEE REPLACEMENT SURGERY  12/5/13    Right TKA by Dr. Justin Lopez   • [address] Right 12/5/2013    Performed by Rocio Ballard MD at [facility] MAIN OR   • [address] Left 10/24/201 no apparent distress  SKIN: anicteric  EYES: PERRLA, EOMI, sclera anicteric  HEENT: normocephalic; normal nose  NECK: supple, no JVD  RESPIRATORY: clear to percussion and auscultation  CARDIOVASCULAR: RRR, murmur negative  ABDOMEN: normal active BS, soft,

## 2021-01-21 NOTE — ANESTHESIA PREPROCEDURE EVALUATION
PRE-OP EVALUATION    Patient Name: Kallie Harrington    Pre-op Diagnosis: Inguinal hernia, right [K40.90]    Procedure(s):  XI ROBOT-ASSISTED LAPAROSCOPIC REPAIR RIGHT INGUINAL HERNIA WITH MESH    Surgeon(s) and Role:     Franny Garcia MD - [facility] 2-2012, [place]    diverticulitis, divertic cyst, Dr Chalo Bustillo Right 5/18/2017    Performed by Tiana Morris MD at [facility] MAIN OR   • EAR TYMPANOMASTOIDECTOMY Right 5/18/2017    Performed by Tiana Morris MD at [facility] MAIN 11/18/2020    .0 11/22/2020     11/18/2020     Lab Results   Component Value Date     11/22/2020     11/18/2020    K 3.5 11/22/2020    K 3.80 11/18/2020     11/22/2020    CL 99 11/18/2020    CO2 25.0 11/22/2020    CO2 25.7 1

## 2021-01-21 NOTE — OPERATIVE REPORT
[address]                                                         Operative Note    Natividad Harrington Location: [address] Perioperative   CSN [id number] MRN [id number]   Admission Date 1/21/2021 Procedure Date 1/21/2021   Attending Physician Shelli Fontanez coverage of the direct indirect and lateral space. Once this was accomplished the peritoneum was brought back up into position and reapproximated with a 3-0 V. LOC suture. Hemostasis was achieved.  A bilateral transabdominal plane (TAP) was performed by inf

## 2021-01-21 NOTE — ANESTHESIA POSTPROCEDURE EVALUATION
[address] Harrington Patient Status:  Hospital Outpatient Surgery   Age/Gender 77year old male MRN [id number]   [facility] SURGERY Attending Corrina Jordan MD   Hosp Day # 0 PCP Danae Tyler MD       Anesthesia Post-

## 2021-05-02 ENCOUNTER — APPOINTMENT (OUTPATIENT)
Dept: CT IMAGING | Facility: HOSPITAL | Age: 66
End: 2021-05-02
Attending: EMERGENCY MEDICINE
Payer: MEDICARE

## 2021-05-02 ENCOUNTER — HOSPITAL ENCOUNTER (EMERGENCY)
Facility: HOSPITAL | Age: 66
Discharge: HOME | End: 2021-05-02
Attending: EMERGENCY MEDICINE
Payer: MEDICARE

## 2021-05-02 VITALS
HEART RATE: 76 BPM | BODY MASS INDEX: 31 KG/M2 | TEMPERATURE: 98 F | DIASTOLIC BLOOD PRESSURE: 76 MMHG | OXYGEN SATURATION: 97 % | SYSTOLIC BLOOD PRESSURE: 147 MMHG | WEIGHT: 225 LBS | RESPIRATION RATE: 16 BRPM

## 2021-05-02 DIAGNOSIS — R10.9 ABDOMINAL PAIN OF UNKNOWN ETIOLOGY: Primary | ICD-10-CM

## 2021-05-02 PROCEDURE — 81001 URINALYSIS AUTO W/SCOPE: CPT | Performed by: EMERGENCY MEDICINE

## 2021-05-02 PROCEDURE — 99284 EMERGENCY DEPT VISIT MOD MDM: CPT | Performed by: EMERGENCY MEDICINE

## 2021-05-02 PROCEDURE — 96374 THER/PROPH/DIAG INJ IV PUSH: CPT | Performed by: EMERGENCY MEDICINE

## 2021-05-02 PROCEDURE — 80053 COMPREHEN METABOLIC PANEL: CPT | Performed by: EMERGENCY MEDICINE

## 2021-05-02 PROCEDURE — 96361 HYDRATE IV INFUSION ADD-ON: CPT | Performed by: EMERGENCY MEDICINE

## 2021-05-02 PROCEDURE — 85025 COMPLETE CBC W/AUTO DIFF WBC: CPT | Performed by: EMERGENCY MEDICINE

## 2021-05-02 PROCEDURE — 83690 ASSAY OF LIPASE: CPT | Performed by: EMERGENCY MEDICINE

## 2021-05-02 PROCEDURE — 74177 CT ABD & PELVIS W/CONTRAST: CPT | Performed by: EMERGENCY MEDICINE

## 2021-05-02 RX ORDER — ONDANSETRON 2 MG/ML
INJECTION INTRAMUSCULAR; INTRAVENOUS
Status: DISCONTINUED
Start: 2021-05-02 | End: 2021-05-02

## 2021-05-02 RX ORDER — ONDANSETRON 2 MG/ML
4 INJECTION INTRAMUSCULAR; INTRAVENOUS ONCE
Status: COMPLETED | OUTPATIENT
Start: 2021-05-02 | End: 2021-05-02

## 2021-05-02 NOTE — ED PROVIDER NOTES
Patient Seen in: [facility] Emergency Department      History   Patient presents with:  Abdomen/Flank Pain    Stated Complaint: Abd pain, R side groin pain    HPI/Subjective:   HPI    Patient presents with right lower quadrant pain.   The patient sta Paresthesia of left foot 8/28/2015   • Renal disorder     small mass in right kidney, being monitored by Dr. Yong Orantes   • Visual impairment     glasses              Past Surgical History:   Procedure Laterality Date   • BACK SURGERY  3/31/14    L4-S1 TLI Wt 102.1 kg   SpO2 96%   BMI 31.38 kg/m²         Physical Exam    General: Alert and oriented x3, appears uncomfortable. HEENT: Normocephalic, atraumatic, pupils equal round and reactive to light. Neck: Supple.   Cardiovascular: Regular rate and rhythm, n imaging was performed. Dose reduction techniques were used. Dose information is transmitted to the [facility] of [facility]) NRDR ([address]) which includes the Dose Index Registry.   PATIENT STATED HISTORY:(As transcribed correlation recommended. 3. Uncomplicated colonic diverticulosis. 4. Hepatomegaly. Please see above for further details.   Dictated by (CST): Boby Moss MD on 5/02/2021 at 10:42 AM     Finalized by (CST): Boby Moss MD on 5/02/2021 at 10:48 A

## 2021-05-02 NOTE — ED INITIAL ASSESSMENT (HPI)
Per patient, RLQ and inguinal pain for 3 days. Denies fever, N/V/D. Last oral intake 0600 cereal and milk, 0730 coffee with sugar and cream. AAo x 3.

## 2021-07-23 ENCOUNTER — LABORATORY ENCOUNTER (OUTPATIENT)
Dept: LAB | Age: 66
End: 2021-07-23
Attending: UROLOGY
Payer: MEDICARE

## 2021-07-23 DIAGNOSIS — N28.89 RENAL MASS: ICD-10-CM

## 2021-07-23 LAB
ANTIBODY SCREEN: NEGATIVE
RH BLOOD TYPE: POSITIVE

## 2021-07-23 PROCEDURE — 86900 BLOOD TYPING SEROLOGIC ABO: CPT

## 2021-07-23 PROCEDURE — 86850 RBC ANTIBODY SCREEN: CPT

## 2021-07-23 PROCEDURE — 86901 BLOOD TYPING SEROLOGIC RH(D): CPT

## 2021-07-26 ENCOUNTER — ANESTHESIA EVENT (OUTPATIENT)
Dept: SURGERY | Facility: HOSPITAL | Age: 66
DRG: 658 | End: 2021-07-26
Payer: MEDICARE

## 2021-07-27 ENCOUNTER — ANESTHESIA (OUTPATIENT)
Dept: SURGERY | Facility: HOSPITAL | Age: 66
DRG: 658 | End: 2021-07-27
Payer: MEDICARE

## 2021-07-27 ENCOUNTER — HOSPITAL ENCOUNTER (INPATIENT)
Facility: HOSPITAL | Age: 66
LOS: 2 days | Discharge: HOME | DRG: 658 | End: 2021-07-29
Attending: UROLOGY | Admitting: UROLOGY
Payer: MEDICARE

## 2021-07-27 DIAGNOSIS — N28.89 RENAL MASS: Primary | ICD-10-CM

## 2021-07-27 PROCEDURE — 88342 IMHCHEM/IMCYTCHM 1ST ANTB: CPT | Performed by: UROLOGY

## 2021-07-27 PROCEDURE — 88307 TISSUE EXAM BY PATHOLOGIST: CPT | Performed by: UROLOGY

## 2021-07-27 PROCEDURE — 0TB04ZZ EXCISION OF RIGHT KIDNEY, PERCUTANEOUS ENDOSCOPIC APPROACH: ICD-10-PCS | Performed by: UROLOGY

## 2021-07-27 PROCEDURE — 76942 ECHO GUIDE FOR BIOPSY: CPT | Performed by: STUDENT IN AN ORGANIZED HEALTH CARE EDUCATION/TRAINING PROGRAM

## 2021-07-27 PROCEDURE — 8E0W4CZ ROBOTIC ASSISTED PROCEDURE OF TRUNK REGION, PERCUTANEOUS ENDOSCOPIC APPROACH: ICD-10-PCS | Performed by: UROLOGY

## 2021-07-27 RX ORDER — SODIUM CHLORIDE 9 MG/ML
INJECTION, SOLUTION INTRAVENOUS CONTINUOUS PRN
Status: DISCONTINUED | OUTPATIENT
Start: 2021-07-27 | End: 2021-07-27 | Stop reason: SURG

## 2021-07-27 RX ORDER — OXYCODONE HYDROCHLORIDE 5 MG/1
5 TABLET ORAL EVERY 4 HOURS PRN
Status: DISCONTINUED | OUTPATIENT
Start: 2021-07-27 | End: 2021-07-29

## 2021-07-27 RX ORDER — OXYCODONE HYDROCHLORIDE 5 MG/1
2.5 TABLET ORAL EVERY 4 HOURS PRN
Status: DISCONTINUED | OUTPATIENT
Start: 2021-07-27 | End: 2021-07-29

## 2021-07-27 RX ORDER — HYDROMORPHONE HYDROCHLORIDE 1 MG/ML
0.2 INJECTION, SOLUTION INTRAMUSCULAR; INTRAVENOUS; SUBCUTANEOUS EVERY 2 HOUR PRN
Status: DISCONTINUED | OUTPATIENT
Start: 2021-07-27 | End: 2021-07-29

## 2021-07-27 RX ORDER — ACETAMINOPHEN 325 MG/1
650 TABLET ORAL
Status: DISCONTINUED | OUTPATIENT
Start: 2021-07-27 | End: 2021-07-29

## 2021-07-27 RX ORDER — PANTOPRAZOLE SODIUM 40 MG/1
40 TABLET, DELAYED RELEASE ORAL
Status: DISCONTINUED | OUTPATIENT
Start: 2021-07-28 | End: 2021-07-29

## 2021-07-27 RX ORDER — ONDANSETRON 2 MG/ML
INJECTION INTRAMUSCULAR; INTRAVENOUS
Status: COMPLETED
Start: 2021-07-27 | End: 2021-07-27

## 2021-07-27 RX ORDER — ALBUTEROL SULFATE 2.5 MG/3ML
2.5 SOLUTION RESPIRATORY (INHALATION) AS NEEDED
Status: DISCONTINUED | OUTPATIENT
Start: 2021-07-27 | End: 2021-07-27 | Stop reason: HOSPADM

## 2021-07-27 RX ORDER — ESMOLOL HYDROCHLORIDE 10 MG/ML
INJECTION INTRAVENOUS AS NEEDED
Status: DISCONTINUED | OUTPATIENT
Start: 2021-07-27 | End: 2021-07-27 | Stop reason: SURG

## 2021-07-27 RX ORDER — LISINOPRIL 10 MG/1
10 TABLET ORAL DAILY
COMMUNITY
End: 2021-12-13

## 2021-07-27 RX ORDER — NALOXONE HYDROCHLORIDE 0.4 MG/ML
80 INJECTION, SOLUTION INTRAMUSCULAR; INTRAVENOUS; SUBCUTANEOUS AS NEEDED
Status: DISCONTINUED | OUTPATIENT
Start: 2021-07-27 | End: 2021-07-27 | Stop reason: HOSPADM

## 2021-07-27 RX ORDER — HYDROMORPHONE HYDROCHLORIDE 1 MG/ML
INJECTION, SOLUTION INTRAMUSCULAR; INTRAVENOUS; SUBCUTANEOUS
Status: COMPLETED
Start: 2021-07-27 | End: 2021-07-27

## 2021-07-27 RX ORDER — ONDANSETRON 2 MG/ML
4 INJECTION INTRAMUSCULAR; INTRAVENOUS EVERY 6 HOURS PRN
Status: DISCONTINUED | OUTPATIENT
Start: 2021-07-27 | End: 2021-07-29

## 2021-07-27 RX ORDER — HEPARIN SODIUM 5000 [USP'U]/ML
INJECTION, SOLUTION INTRAVENOUS; SUBCUTANEOUS
Status: DISPENSED
Start: 2021-07-27 | End: 2021-07-27

## 2021-07-27 RX ORDER — PROCHLORPERAZINE EDISYLATE 5 MG/ML
10 INJECTION INTRAMUSCULAR; INTRAVENOUS EVERY 6 HOURS PRN
Status: DISCONTINUED | OUTPATIENT
Start: 2021-07-27 | End: 2021-07-29

## 2021-07-27 RX ORDER — ROCURONIUM BROMIDE 10 MG/ML
INJECTION, SOLUTION INTRAVENOUS AS NEEDED
Status: DISCONTINUED | OUTPATIENT
Start: 2021-07-27 | End: 2021-07-27 | Stop reason: SURG

## 2021-07-27 RX ORDER — CEFAZOLIN SODIUM/WATER 2 G/20 ML
2 SYRINGE (ML) INTRAVENOUS EVERY 8 HOURS
Status: COMPLETED | OUTPATIENT
Start: 2021-07-27 | End: 2021-07-28

## 2021-07-27 RX ORDER — LIDOCAINE HYDROCHLORIDE ANHYDROUS AND DEXTROSE MONOHYDRATE .8; 5 G/100ML; G/100ML
INJECTION, SOLUTION INTRAVENOUS CONTINUOUS PRN
Status: DISCONTINUED | OUTPATIENT
Start: 2021-07-27 | End: 2021-07-27 | Stop reason: SURG

## 2021-07-27 RX ORDER — HYDROCODONE BITARTRATE AND ACETAMINOPHEN 5; 325 MG/1; MG/1
2 TABLET ORAL AS NEEDED
Status: DISCONTINUED | OUTPATIENT
Start: 2021-07-27 | End: 2021-07-27 | Stop reason: HOSPADM

## 2021-07-27 RX ORDER — LABETALOL HYDROCHLORIDE 5 MG/ML
5 INJECTION, SOLUTION INTRAVENOUS EVERY 5 MIN PRN
Status: DISCONTINUED | OUTPATIENT
Start: 2021-07-27 | End: 2021-07-27 | Stop reason: HOSPADM

## 2021-07-27 RX ORDER — HYDROMORPHONE HYDROCHLORIDE 1 MG/ML
0.4 INJECTION, SOLUTION INTRAMUSCULAR; INTRAVENOUS; SUBCUTANEOUS EVERY 2 HOUR PRN
Status: DISCONTINUED | OUTPATIENT
Start: 2021-07-27 | End: 2021-07-29

## 2021-07-27 RX ORDER — MEPERIDINE HYDROCHLORIDE 25 MG/ML
12.5 INJECTION INTRAMUSCULAR; INTRAVENOUS; SUBCUTANEOUS AS NEEDED
Status: DISCONTINUED | OUTPATIENT
Start: 2021-07-27 | End: 2021-07-27 | Stop reason: HOSPADM

## 2021-07-27 RX ORDER — PHENYLEPHRINE HCL 10 MG/ML
VIAL (ML) INJECTION AS NEEDED
Status: DISCONTINUED | OUTPATIENT
Start: 2021-07-27 | End: 2021-07-27 | Stop reason: SURG

## 2021-07-27 RX ORDER — HYDROMORPHONE HYDROCHLORIDE 1 MG/ML
0.4 INJECTION, SOLUTION INTRAMUSCULAR; INTRAVENOUS; SUBCUTANEOUS EVERY 5 MIN PRN
Status: DISCONTINUED | OUTPATIENT
Start: 2021-07-27 | End: 2021-07-27 | Stop reason: HOSPADM

## 2021-07-27 RX ORDER — SODIUM CHLORIDE 9 MG/ML
INJECTION, SOLUTION INTRAVENOUS CONTINUOUS
Status: DISCONTINUED | OUTPATIENT
Start: 2021-07-27 | End: 2021-07-29

## 2021-07-27 RX ORDER — ONDANSETRON 2 MG/ML
4 INJECTION INTRAMUSCULAR; INTRAVENOUS AS NEEDED
Status: DISCONTINUED | OUTPATIENT
Start: 2021-07-27 | End: 2021-07-27 | Stop reason: HOSPADM

## 2021-07-27 RX ORDER — TAMSULOSIN HYDROCHLORIDE 0.4 MG/1
0.4 CAPSULE ORAL DAILY
Status: DISCONTINUED | OUTPATIENT
Start: 2021-07-27 | End: 2021-07-29

## 2021-07-27 RX ORDER — OMEPRAZOLE 40 MG/1
40 CAPSULE, DELAYED RELEASE ORAL DAILY
COMMUNITY
End: 2021-12-13

## 2021-07-27 RX ORDER — CEFAZOLIN SODIUM/WATER 2 G/20 ML
2 SYRINGE (ML) INTRAVENOUS ONCE
Status: COMPLETED | OUTPATIENT
Start: 2021-07-27 | End: 2021-07-27

## 2021-07-27 RX ORDER — DOCUSATE SODIUM 100 MG/1
100 CAPSULE, LIQUID FILLED ORAL 2 TIMES DAILY
Status: DISCONTINUED | OUTPATIENT
Start: 2021-07-27 | End: 2021-07-29

## 2021-07-27 RX ORDER — MIDAZOLAM HYDROCHLORIDE 1 MG/ML
INJECTION INTRAMUSCULAR; INTRAVENOUS AS NEEDED
Status: DISCONTINUED | OUTPATIENT
Start: 2021-07-27 | End: 2021-07-27 | Stop reason: SURG

## 2021-07-27 RX ORDER — HEPARIN SODIUM 5000 [USP'U]/ML
5000 INJECTION, SOLUTION INTRAVENOUS; SUBCUTANEOUS ONCE
Status: COMPLETED | OUTPATIENT
Start: 2021-07-27 | End: 2021-07-27

## 2021-07-27 RX ORDER — METOCLOPRAMIDE HYDROCHLORIDE 5 MG/ML
INJECTION INTRAMUSCULAR; INTRAVENOUS AS NEEDED
Status: DISCONTINUED | OUTPATIENT
Start: 2021-07-27 | End: 2021-07-27 | Stop reason: SURG

## 2021-07-27 RX ORDER — DEXAMETHASONE SODIUM PHOSPHATE 4 MG/ML
VIAL (ML) INJECTION AS NEEDED
Status: DISCONTINUED | OUTPATIENT
Start: 2021-07-27 | End: 2021-07-27 | Stop reason: SURG

## 2021-07-27 RX ORDER — HYDROCODONE BITARTRATE AND ACETAMINOPHEN 5; 325 MG/1; MG/1
1 TABLET ORAL AS NEEDED
Status: DISCONTINUED | OUTPATIENT
Start: 2021-07-27 | End: 2021-07-27 | Stop reason: HOSPADM

## 2021-07-27 RX ORDER — ONDANSETRON 4 MG/1
4 TABLET, FILM COATED ORAL EVERY 6 HOURS PRN
Status: DISCONTINUED | OUTPATIENT
Start: 2021-07-27 | End: 2021-07-29

## 2021-07-27 RX ORDER — ACETAMINOPHEN 500 MG
1000 TABLET ORAL ONCE
Status: DISCONTINUED | OUTPATIENT
Start: 2021-07-27 | End: 2021-07-29

## 2021-07-27 RX ORDER — KETAMINE HYDROCHLORIDE 50 MG/ML
INJECTION, SOLUTION, CONCENTRATE INTRAMUSCULAR; INTRAVENOUS AS NEEDED
Status: DISCONTINUED | OUTPATIENT
Start: 2021-07-27 | End: 2021-07-27 | Stop reason: SURG

## 2021-07-27 RX ORDER — ATORVASTATIN CALCIUM 20 MG/1
20 TABLET, FILM COATED ORAL NIGHTLY
Status: DISCONTINUED | OUTPATIENT
Start: 2021-07-27 | End: 2021-07-29

## 2021-07-27 RX ORDER — CEFAZOLIN SODIUM/WATER 2 G/20 ML
SYRINGE (ML) INTRAVENOUS
Status: DISPENSED
Start: 2021-07-27 | End: 2021-07-27

## 2021-07-27 RX ORDER — LISINOPRIL 10 MG/1
10 TABLET ORAL DAILY
Status: DISCONTINUED | OUTPATIENT
Start: 2021-07-27 | End: 2021-07-29

## 2021-07-27 RX ORDER — SODIUM CHLORIDE, SODIUM LACTATE, POTASSIUM CHLORIDE, CALCIUM CHLORIDE 600; 310; 30; 20 MG/100ML; MG/100ML; MG/100ML; MG/100ML
INJECTION, SOLUTION INTRAVENOUS CONTINUOUS
Status: DISCONTINUED | OUTPATIENT
Start: 2021-07-27 | End: 2021-07-27 | Stop reason: HOSPADM

## 2021-07-27 RX ORDER — ENOXAPARIN SODIUM 100 MG/ML
40 INJECTION SUBCUTANEOUS DAILY
Status: DISCONTINUED | OUTPATIENT
Start: 2021-07-27 | End: 2021-07-29

## 2021-07-27 RX ORDER — LIDOCAINE HYDROCHLORIDE 10 MG/ML
INJECTION, SOLUTION EPIDURAL; INFILTRATION; INTRACAUDAL; PERINEURAL AS NEEDED
Status: DISCONTINUED | OUTPATIENT
Start: 2021-07-27 | End: 2021-07-27 | Stop reason: SURG

## 2021-07-27 RX ADMIN — LIDOCAINE HYDROCHLORIDE ANHYDROUS AND DEXTROSE MONOHYDRATE 2 MG/KG/HR: .8; 5 INJECTION, SOLUTION INTRAVENOUS at 09:27:00

## 2021-07-27 RX ADMIN — ROCURONIUM BROMIDE 10 MG: 10 INJECTION, SOLUTION INTRAVENOUS at 09:27:00

## 2021-07-27 RX ADMIN — KETAMINE HYDROCHLORIDE 10 MG: 50 INJECTION, SOLUTION, CONCENTRATE INTRAMUSCULAR; INTRAVENOUS at 10:30:00

## 2021-07-27 RX ADMIN — ROCURONIUM BROMIDE 30 MG: 10 INJECTION, SOLUTION INTRAVENOUS at 09:35:00

## 2021-07-27 RX ADMIN — ROCURONIUM BROMIDE 10 MG: 10 INJECTION, SOLUTION INTRAVENOUS at 09:45:00

## 2021-07-27 RX ADMIN — DEXAMETHASONE SODIUM PHOSPHATE 4 MG: 4 MG/ML VIAL (ML) INJECTION at 09:27:00

## 2021-07-27 RX ADMIN — ROCURONIUM BROMIDE 10 MG: 10 INJECTION, SOLUTION INTRAVENOUS at 10:30:00

## 2021-07-27 RX ADMIN — SODIUM CHLORIDE, SODIUM LACTATE, POTASSIUM CHLORIDE, CALCIUM CHLORIDE: 600; 310; 30; 20 INJECTION, SOLUTION INTRAVENOUS at 09:22:00

## 2021-07-27 RX ADMIN — KETAMINE HYDROCHLORIDE 10 MG: 50 INJECTION, SOLUTION, CONCENTRATE INTRAMUSCULAR; INTRAVENOUS at 09:45:00

## 2021-07-27 RX ADMIN — PHENYLEPHRINE HCL 100 MCG: 10 MG/ML VIAL (ML) INJECTION at 10:10:00

## 2021-07-27 RX ADMIN — KETAMINE HYDROCHLORIDE 10 MG: 50 INJECTION, SOLUTION, CONCENTRATE INTRAMUSCULAR; INTRAVENOUS at 10:15:00

## 2021-07-27 RX ADMIN — LIDOCAINE HYDROCHLORIDE 50 MG: 10 INJECTION, SOLUTION EPIDURAL; INFILTRATION; INTRACAUDAL; PERINEURAL at 09:27:00

## 2021-07-27 RX ADMIN — CEFAZOLIN SODIUM/WATER 2 G: 2 G/20 ML SYRINGE (ML) INTRAVENOUS at 09:45:00

## 2021-07-27 RX ADMIN — MIDAZOLAM HYDROCHLORIDE 2 MG: 1 INJECTION INTRAMUSCULAR; INTRAVENOUS at 09:22:00

## 2021-07-27 RX ADMIN — LIDOCAINE HYDROCHLORIDE ANHYDROUS AND DEXTROSE MONOHYDRATE 1 MG/KG/HR: .8; 5 INJECTION, SOLUTION INTRAVENOUS at 10:50:00

## 2021-07-27 RX ADMIN — KETAMINE HYDROCHLORIDE 10 MG: 50 INJECTION, SOLUTION, CONCENTRATE INTRAMUSCULAR; INTRAVENOUS at 10:45:00

## 2021-07-27 RX ADMIN — SODIUM CHLORIDE: 9 INJECTION, SOLUTION INTRAVENOUS at 09:35:00

## 2021-07-27 RX ADMIN — KETAMINE HYDROCHLORIDE 10 MG: 50 INJECTION, SOLUTION, CONCENTRATE INTRAMUSCULAR; INTRAVENOUS at 10:00:00

## 2021-07-27 RX ADMIN — METOCLOPRAMIDE HYDROCHLORIDE 10 MG: 5 INJECTION INTRAMUSCULAR; INTRAVENOUS at 09:27:00

## 2021-07-27 RX ADMIN — PHENYLEPHRINE HCL 100 MCG: 10 MG/ML VIAL (ML) INJECTION at 10:50:00

## 2021-07-27 RX ADMIN — SODIUM CHLORIDE, SODIUM LACTATE, POTASSIUM CHLORIDE, CALCIUM CHLORIDE: 600; 310; 30; 20 INJECTION, SOLUTION INTRAVENOUS at 10:30:00

## 2021-07-27 RX ADMIN — ESMOLOL HYDROCHLORIDE 50 MG: 10 INJECTION INTRAVENOUS at 09:27:00

## 2021-07-27 NOTE — PLAN OF CARE
NURSING ADMISSION NOTE      Pt arrived from the PACU in stable condition     Patient admitted via [address] to room. Safety precautions initiated. Bed in low position. Call light in reach.

## 2021-07-27 NOTE — H&P
Pre-op Diagnosis: Right Renal mass [N28.89]    The above referenced H&P by Dr Raenelle Galeazzi from 7/23/21 was reviewed by Marylin Mcgarry MD on 7/27/2021, the patient was examined and no significant changes have occurred in the patient's condition since the H&P

## 2021-07-27 NOTE — ANESTHESIA PREPROCEDURE EVALUATION
PRE-OP EVALUATION    Patient Name: Destiney Harrington    Admit Diagnosis: Renal mass [N28.89]    Pre-op Diagnosis: Renal mass [N28.89]    XI ROBOT-ASSISTED LAPAROSCOPIC PARTIAL RIGHT NEPHRECTOMY    Anesthesia Procedure: XI ROBOT-ASSISTED LAPAROSCOPIC • UPPER GI ENDOSCOPY - REFERRAL  2-2012, [place]    hiatal hernia, Dr Pascual Renner   • VASECTOMY       Social History    Tobacco Use      Smoking status: Former Smoker        Packs/day: 1.50        Years: 6.00        Pack years: 9        Quit date: 3/2/198 Admission:  **None**

## 2021-07-27 NOTE — ANESTHESIA POSTPROCEDURE EVALUATION
[address] Har[address] Patient Status:  Inpatient   Age/Gender 74year old male MRN [id number]   Location [address] Attending Rian Moritz, MD   Hosp Day # 0 PCP Paxton Palacios MD       Anesthesia Po

## 2021-07-27 NOTE — ANESTHESIA PROCEDURE NOTES
Peripheral IV  Date/Time: 7/27/2021 9:38 AM  Inserted by:  Sylvester Villar MD    Placement  Needle size: 18 G  Laterality: right  Location: forearm  Local anesthetic: none  Site prep: alcohol  Technique: anatomical landmarks  Attempts: 1

## 2021-07-27 NOTE — ANESTHESIA PROCEDURE NOTES
Arterial Line  Performed by: Gwyn Nieto MD  Authorized by:  Gwyn Nieto MD     General Information and Staff    Procedure Start:  7/27/2021 9:38 AM  Procedure End:  7/27/2021 9:40 AM  Anesthesiologist:  Gwyn Nieto MD  Performed By:  Anesthesiologist

## 2021-07-27 NOTE — ANESTHESIA PROCEDURE NOTES
Regional Block  Performed by: Nga Shea MD  Authorized by: Nga Shea MD       General Information and Staff    Start Time:  7/27/2021 9:30 AM  End Time:  7/27/2021 9:33 AM  Anesthesiologist:  Nga Shea MD  Performed by:   Anesthesiologist  Patient

## 2021-07-27 NOTE — ANESTHESIA PROCEDURE NOTES
Airway  Date/Time: 7/27/2021 9:29 AM  Urgency: elective      General Information and Staff    Patient location during procedure: OR  Anesthesiologist: Andrew Doherty MD  Performed: anesthesiologist     Indications and Patient Condition  Indications for airwa

## 2021-07-27 NOTE — CONSULTS
General Medicine Consult      Reason for consult: post-op medical management    Consulted by: Dr. Miguel Chávez    PCP: Davide Mireles MD      History of Present Illness: Patient is a 74year old male with PMH sig for BPH, GERD, HTN, diverticulitis s/p colon diverticulitis, divertic cyst, Dr Annemarie Ascencio  01/21/2021    Robot-assisted laparoscopic repair right inguinal hernia with mesh   • KNEE REPLACEMENT SURGERY  10/2013    left   • KNEE REPLACEMENT SURGERY  12/5/13    Right TKA by Dr. Yennifer Turner sodium  100 mg Oral BID   • ceFAZolin  2 g Intravenous Q8H     Continuous Infusing Medication:  • sodium chloride       PRN Medication:oxyCODONE HCl **OR** oxyCODONE HCl, HYDROmorphone HCl **OR** HYDROmorphone HCl, ondansetron **OR** ondansetron       ALL: last 168 hours. No results for input(s): ALT, AST, ALB, AMYLASE, LIPASE, LDH in the last 168 hours.     Invalid input(s): ALPHOS, TBIL, DBIL, TPROT    Radiology: US KIDNEYS (BWQ=[id number])    Result Date: 6/28/2021  DATE OF SERVICE: 06.28.2021 US KIDNEYS (CP is unremarkable.           ASSESSMENT / PLAN:    # Right Renal mass s/p robotic assisted right partial nephrectomy  - management per urology  --advance diet as tolerated  - Am labs    # Post-op pain   - Transition to PO pain medications as tolerated and as

## 2021-07-27 NOTE — OPERATIVE REPORT
[facility] Urology       Operative Note      Avila Harrington Patient Status:  Inpatient    1955 MRN [id number]   Location [address] POST ANESTHESIA CARE UNIT Attending Cassandra Barnett MD   Hosp Day # 0 PCP Holly Mulligan MD         DA No organ injury was observed. The remaining ports were placed in standard fashion under direct vision including an 8-mm trocar subcostally, an 8 mm trocar in left lower abdomen,  and another 12-mm just inferior to the first camera port.  The robotic arms transferred to PACU in stable condition and he was extubated without difficulty.   The patient will be admitted to hospital.    Bhavesh Cartwright MD  [facility] Urology  7/27/2021  11:33 AM

## 2021-07-28 LAB
ANION GAP SERPL CALC-SCNC: 5 MMOL/L (ref 0–18)
BUN BLD-MCNC: 13 MG/DL (ref 7–18)
BUN/CREAT SERPL: 14.4 (ref 10–20)
CALCIUM BLD-MCNC: 8.8 MG/DL (ref 8.5–10.1)
CHLORIDE SERPL-SCNC: 109 MMOL/L (ref 98–112)
CO2 SERPL-SCNC: 24 MMOL/L (ref 21–32)
CREAT BLD-MCNC: 0.9 MG/DL
DEPRECATED RDW RBC AUTO: 40.5 FL (ref 35.1–46.3)
ERYTHROCYTE [DISTWIDTH] IN BLOOD BY AUTOMATED COUNT: 13.4 % (ref 11–15)
GLUCOSE BLD-MCNC: 114 MG/DL (ref 70–99)
HCT VFR BLD AUTO: 39 %
HGB BLD-MCNC: 12.9 G/DL
MCH RBC QN AUTO: 27.5 PG (ref 26–34)
MCHC RBC AUTO-ENTMCNC: 33.1 G/DL (ref 31–37)
MCV RBC AUTO: 83.2 FL
OSMOLALITY SERPL CALC.SUM OF ELEC: 287 MOSM/KG (ref 275–295)
PLATELET # BLD AUTO: 224 10(3)UL (ref 150–450)
POTASSIUM SERPL-SCNC: 4 MMOL/L (ref 3.5–5.1)
RBC # BLD AUTO: 4.69 X10(6)UL
SODIUM SERPL-SCNC: 138 MMOL/L (ref 136–145)
WBC # BLD AUTO: 18.8 X10(3) UL (ref 4–11)

## 2021-07-28 PROCEDURE — 85027 COMPLETE CBC AUTOMATED: CPT | Performed by: UROLOGY

## 2021-07-28 PROCEDURE — 80048 BASIC METABOLIC PNL TOTAL CA: CPT | Performed by: UROLOGY

## 2021-07-28 NOTE — PROGRESS NOTES
STEVE Hospitalist Progress Note     [facility]      SUBJECTIVE:  Feeling better, pain 4/10 after pain meds this AM  No nausea    OBJECTIVE:  Temp:  [97.3 °F (36.3 °C)-98.7 °F (37.1 °C)] 98.7 °F (37.1 °C)  Pulse:  [51-77] 71  Resp:  [10-24] 18  BP: (10 in the inferior pole of the kidney is reidentified, and sonographically measures 2.8 x 2.8 x 2.6 cm (CC, AP, TV). The second hypodense lesion felt to represent a tiny proteinaceous cyst is not identified sonographically.  The lesion is larger than that seen (DILAUDID) 1 MG/ML injection 0.2 mg, 0.2 mg, Intravenous, Q2H PRN   Or  HYDROmorphone HCl (DILAUDID) 1 MG/ML injection 0.4 mg, 0.4 mg, Intravenous, Q2H PRN  docusate sodium (COLACE) cap 100 mg, 100 mg, Oral, BID  ondansetron (ZOFRAN) injection 4 mg, 4 mg,

## 2021-07-28 NOTE — PLAN OF CARE
Pt up in chair. C/o of abd tenderness, meds given. Abd soft, Bs present, no flatus. Gun given to help stimulate bowel. Voiding adequate amounts of clear yellow urine. Lap sites cdi. Pt ambulating.   Problem: PAIN - ADULT  Goal: Verbalizes/displays adequate

## 2021-07-28 NOTE — PLAN OF CARE
PT VSS, AOX4. Pt on soft diet, only had clear liquids so far, tolerating well w/ no nausea, bowel sounds present x4, denies flatus or urge for BM but is belching. Ambulates with steady gait in room & hallway.  Voids in adequate amounts & urine is clear/yell Instruct pt to call for assistance with activity based on assessment  - Modify environment to reduce risk of injury  - Provide assistive devices as appropriate  - Consider OT/PT consult to assist with strengthening/mobility  - Encourage toileting schedule

## 2021-07-28 NOTE — PROGRESS NOTES
.progr  [facility]    Progress Note    Pat Harrington Patient Status:  Inpatient    1955 MRN [id number]   [facility] 3NW-A Attending Kirstin Carranza MD   Hosp Day # 1 PCP Valeri Mendoza MD     HPI/Subjective:   PoD # 1 tolerated  Follow labs and vitals  Use of IS x 10/hr  Tolerating diet okay  Pain management  Bowel regimen, no flatus yet  Monitor urine output and color  Okay for  discharge if passes gas    Discussed with Nurse    Gus Emerson NP  7/28/2021

## 2021-07-29 VITALS
BODY MASS INDEX: 31.31 KG/M2 | HEART RATE: 72 BPM | TEMPERATURE: 98 F | DIASTOLIC BLOOD PRESSURE: 73 MMHG | OXYGEN SATURATION: 93 % | WEIGHT: 223.69 LBS | HEIGHT: 71 IN | SYSTOLIC BLOOD PRESSURE: 124 MMHG | RESPIRATION RATE: 16 BRPM

## 2021-07-29 LAB
DEPRECATED RDW RBC AUTO: 40.2 FL (ref 35.1–46.3)
ERYTHROCYTE [DISTWIDTH] IN BLOOD BY AUTOMATED COUNT: 13.3 % (ref 11–15)
HCT VFR BLD AUTO: 37.2 %
HGB BLD-MCNC: 12.5 G/DL
MCH RBC QN AUTO: 28 PG (ref 26–34)
MCHC RBC AUTO-ENTMCNC: 33.6 G/DL (ref 31–37)
MCV RBC AUTO: 83.4 FL
PLATELET # BLD AUTO: 194 10(3)UL (ref 150–450)
RBC # BLD AUTO: 4.46 X10(6)UL
WBC # BLD AUTO: 10.4 X10(3) UL (ref 4–11)

## 2021-07-29 PROCEDURE — 85027 COMPLETE CBC AUTOMATED: CPT | Performed by: UROLOGY

## 2021-07-29 RX ORDER — HYDROCODONE BITARTRATE AND ACETAMINOPHEN 5; 325 MG/1; MG/1
1 TABLET ORAL EVERY 4 HOURS PRN
Qty: 12 TABLET | Refills: 0 | Status: SHIPPED | OUTPATIENT
Start: 2021-07-29 | End: 2021-08-08

## 2021-07-29 NOTE — PROGRESS NOTES
STEVE Hospitalist Progress Note     [facility]      SUBJECTIVE:  No acute events overnight  Voiding  No flatus yet  Ambulating     OBJECTIVE:  Temp:  [97.9 °F (36.6 °C)-98.7 °F (37.1 °C)] 98 °F (36.7 °C)  Pulse:  [70-80] 72  Resp:  [16-18] 16  BP: (10 The isodense partially defined renal lesion in the inferior pole of the kidney is reidentified, and sonographically measures 2.8 x 2.8 x 2.6 cm (CC, AP, TV).  The second hypodense lesion felt to represent a tiny proteinaceous cyst is not identified sonograp 5 mg, Oral, Q4H PRN  HYDROmorphone HCl (DILAUDID) 1 MG/ML injection 0.2 mg, 0.2 mg, Intravenous, Q2H PRN   Or  HYDROmorphone HCl (DILAUDID) 1 MG/ML injection 0.4 mg, 0.4 mg, Intravenous, Q2H PRN  docusate sodium (COLACE) cap 100 mg, 100 mg, Oral, BID  onda

## 2021-07-29 NOTE — PLAN OF CARE
Pt a&ox4, VSS, afebrile. Denies passing gas. Pt chewing gum to help stimulate bowel, ambulating frequently. Denies nausea. Voiding clear yellow urine. Lap sites w/ glue c/d/i. Pt reporting mild pain, well managed with scheduled pain meds.  Plan of care disc physical needs  - Identify cognitive and physical deficits and behaviors that affect risk of falls.   - [place] fall precautions as indicated by assessment.  - Educate pt/family on patient safety including physical limitations  - Instruct pt to call for a

## 2021-07-29 NOTE — PROGRESS NOTES
[facility]    Progress Note    Fatouabraham Vivasteo Patient Status:  Inpatient    1955 MRN [id number]   [facility] 3NW-A Attending Ines Humphrey MD   Hosp Day # 2 PCP Melina Schneider MD     HPI/Subjective:   Tatiana Gamboa Lennie Rasheed as scheduled.     Yamil Torres MD  7/29/2021

## 2021-07-29 NOTE — PLAN OF CARE
Patient is alert and oriented x3  Up ad kala  Voiding  Tolerating low res diet  Passed gas this AM  Incisions to ABD are CDI    Patient assessed and ready for DC home  All instructions given to patient  All questions answered to patients level of satisfacti pt/family on patient safety including physical limitations  - Instruct pt to call for assistance with activity based on assessment  - Modify environment to reduce risk of injury  - Provide assistive devices as appropriate  - Consider OT/PT consult to sharita

## 2021-08-09 NOTE — DISCHARGE SUMMARY
[facility]  Discharge Summary    Cherie Harrington Patient Status:  Inpatient    1955 MRN [id number]   [facility] 3NW-A Attending No att. providers found   Hosp Day # 2 PCP Anjelica Hare MD     Date of Admission: 2021 Historical    Omeprazole 40 MG Oral Capsule Delayed Release  Take 40 mg by mouth daily. , Historical    tamsulosin (FLOMAX) cap  Take 1 capsule (0.4 mg total) by mouth daily. , Normal, Disp-90 capsule, R-3    atorvastatin 20 MG Oral Tab  Take 1 tablet (20 mg

## 2021-12-21 PROBLEM — R73.03 PRE-DIABETES: Status: ACTIVE | Noted: 2021-01-01

## 2022-12-01 ENCOUNTER — HOSPITAL ENCOUNTER (EMERGENCY)
Facility: HOSPITAL | Age: 67
Discharge: HOME | End: 2022-12-02
Attending: EMERGENCY MEDICINE
Payer: MEDICARE

## 2022-12-01 DIAGNOSIS — H81.399 PERIPHERAL VERTIGO, UNSPECIFIED LATERALITY: Primary | ICD-10-CM

## 2022-12-01 PROCEDURE — 93010 ELECTROCARDIOGRAM REPORT: CPT

## 2022-12-01 PROCEDURE — 99285 EMERGENCY DEPT VISIT HI MDM: CPT

## 2022-12-01 PROCEDURE — 36415 COLL VENOUS BLD VENIPUNCTURE: CPT

## 2022-12-01 RX ORDER — GABAPENTIN 100 MG/1
100 CAPSULE ORAL 3 TIMES DAILY
COMMUNITY

## 2022-12-02 ENCOUNTER — APPOINTMENT (OUTPATIENT)
Dept: MRI IMAGING | Facility: HOSPITAL | Age: 67
End: 2022-12-02
Attending: EMERGENCY MEDICINE
Payer: MEDICARE

## 2022-12-02 VITALS
TEMPERATURE: 98 F | HEART RATE: 77 BPM | RESPIRATION RATE: 17 BRPM | BODY MASS INDEX: 33 KG/M2 | WEIGHT: 237 LBS | SYSTOLIC BLOOD PRESSURE: 136 MMHG | OXYGEN SATURATION: 96 % | DIASTOLIC BLOOD PRESSURE: 90 MMHG

## 2022-12-02 LAB
ALBUMIN SERPL-MCNC: 3.6 G/DL (ref 3.4–5)
ALBUMIN/GLOB SERPL: 0.9 {RATIO} (ref 1–2)
ALP LIVER SERPL-CCNC: 78 U/L
ALT SERPL-CCNC: 26 U/L
ANION GAP SERPL CALC-SCNC: 4 MMOL/L (ref 0–18)
APTT PPP: 29.2 SECONDS (ref 23.3–35.6)
AST SERPL-CCNC: 29 U/L (ref 15–37)
ATRIAL RATE: 74 BPM
BASOPHILS # BLD AUTO: 0.1 X10(3) UL (ref 0–0.2)
BASOPHILS NFR BLD AUTO: 1.3 %
BILIRUB SERPL-MCNC: 0.4 MG/DL (ref 0.1–2)
BUN BLD-MCNC: 14 MG/DL (ref 7–18)
CALCIUM BLD-MCNC: 9.2 MG/DL (ref 8.5–10.1)
CHLORIDE SERPL-SCNC: 106 MMOL/L (ref 98–112)
CO2 SERPL-SCNC: 28 MMOL/L (ref 21–32)
CREAT BLD-MCNC: 1.05 MG/DL
EOSINOPHIL # BLD AUTO: 0.22 X10(3) UL (ref 0–0.7)
EOSINOPHIL NFR BLD AUTO: 2.8 %
ERYTHROCYTE [DISTWIDTH] IN BLOOD BY AUTOMATED COUNT: 13.1 %
GFR SERPLBLD BASED ON 1.73 SQ M-ARVRAT: 77 ML/MIN/1.73M2 (ref 60–?)
GLOBULIN PLAS-MCNC: 4.1 G/DL (ref 2.8–4.4)
GLUCOSE BLD-MCNC: 110 MG/DL (ref 70–99)
GLUCOSE BLD-MCNC: 122 MG/DL (ref 70–99)
HCT VFR BLD AUTO: 42.8 %
HGB BLD-MCNC: 14.1 G/DL
IMM GRANULOCYTES # BLD AUTO: 0.01 X10(3) UL (ref 0–1)
IMM GRANULOCYTES NFR BLD: 0.1 %
INR BLD: 0.98 (ref 0.85–1.16)
LYMPHOCYTES # BLD AUTO: 2.17 X10(3) UL (ref 1–4)
LYMPHOCYTES NFR BLD AUTO: 27.3 %
MCH RBC QN AUTO: 28.1 PG (ref 26–34)
MCHC RBC AUTO-ENTMCNC: 32.9 G/DL (ref 31–37)
MCV RBC AUTO: 85.4 FL
MONOCYTES # BLD AUTO: 0.87 X10(3) UL (ref 0.1–1)
MONOCYTES NFR BLD AUTO: 10.9 %
NEUTROPHILS # BLD AUTO: 4.59 X10 (3) UL (ref 1.5–7.7)
NEUTROPHILS # BLD AUTO: 4.59 X10(3) UL (ref 1.5–7.7)
NEUTROPHILS NFR BLD AUTO: 57.6 %
OSMOLALITY SERPL CALC.SUM OF ELEC: 288 MOSM/KG (ref 275–295)
P AXIS: -5 DEGREES
P-R INTERVAL: 158 MS
PLATELET # BLD AUTO: 236 10(3)UL (ref 150–450)
POTASSIUM SERPL-SCNC: 3.9 MMOL/L (ref 3.5–5.1)
PROT SERPL-MCNC: 7.7 G/DL (ref 6.4–8.2)
PROTHROMBIN TIME: 13 SECONDS (ref 11.6–14.8)
Q-T INTERVAL: 386 MS
QRS DURATION: 84 MS
QTC CALCULATION (BEZET): 428 MS
R AXIS: -2 DEGREES
RBC # BLD AUTO: 5.01 X10(6)UL
SODIUM SERPL-SCNC: 138 MMOL/L (ref 136–145)
T AXIS: 1 DEGREES
TROPONIN I HIGH SENSITIVITY: 13 NG/L
VENTRICULAR RATE: 74 BPM
WBC # BLD AUTO: 8 X10(3) UL (ref 4–11)

## 2022-12-02 PROCEDURE — 82962 GLUCOSE BLOOD TEST: CPT

## 2022-12-02 PROCEDURE — 85610 PROTHROMBIN TIME: CPT | Performed by: EMERGENCY MEDICINE

## 2022-12-02 PROCEDURE — 85025 COMPLETE CBC W/AUTO DIFF WBC: CPT | Performed by: EMERGENCY MEDICINE

## 2022-12-02 PROCEDURE — 85730 THROMBOPLASTIN TIME PARTIAL: CPT | Performed by: EMERGENCY MEDICINE

## 2022-12-02 PROCEDURE — 80053 COMPREHEN METABOLIC PANEL: CPT | Performed by: EMERGENCY MEDICINE

## 2022-12-02 PROCEDURE — 70549 MR ANGIOGRAPH NECK W/O&W/DYE: CPT | Performed by: EMERGENCY MEDICINE

## 2022-12-02 PROCEDURE — 70546 MR ANGIOGRAPH HEAD W/O&W/DYE: CPT | Performed by: EMERGENCY MEDICINE

## 2022-12-02 PROCEDURE — 70553 MRI BRAIN STEM W/O & W/DYE: CPT | Performed by: EMERGENCY MEDICINE

## 2022-12-02 PROCEDURE — A9575 INJ GADOTERATE MEGLUMI 0.1ML: HCPCS | Performed by: EMERGENCY MEDICINE

## 2022-12-02 PROCEDURE — 93005 ELECTROCARDIOGRAM TRACING: CPT

## 2022-12-02 PROCEDURE — 84484 ASSAY OF TROPONIN QUANT: CPT | Performed by: EMERGENCY MEDICINE

## 2022-12-02 RX ORDER — MECLIZINE HYDROCHLORIDE 25 MG/1
25 TABLET ORAL 3 TIMES DAILY PRN
Qty: 30 TABLET | Refills: 0 | Status: SHIPPED | OUTPATIENT
Start: 2022-12-02

## 2022-12-02 RX ORDER — ONDANSETRON 2 MG/ML
4 INJECTION INTRAMUSCULAR; INTRAVENOUS ONCE
Status: DISCONTINUED | OUTPATIENT
Start: 2022-12-02 | End: 2022-12-02

## 2022-12-02 RX ORDER — GADOTERATE MEGLUMINE 376.9 MG/ML
20 INJECTION INTRAVENOUS
Status: COMPLETED | OUTPATIENT
Start: 2022-12-02 | End: 2022-12-02

## 2022-12-02 RX ORDER — MECLIZINE HYDROCHLORIDE 25 MG/1
25 TABLET ORAL ONCE
Status: COMPLETED | OUTPATIENT
Start: 2022-12-02 | End: 2022-12-02

## 2022-12-02 RX ORDER — GADOTERATE MEGLUMINE 376.9 MG/ML
20 INJECTION INTRAVENOUS
Status: DISCONTINUED | OUTPATIENT
Start: 2022-12-02 | End: 2022-12-02

## 2022-12-02 NOTE — ED INITIAL ASSESSMENT (HPI)
Arrives with complaints of dizziness that started today. When trying to stand his legs start to shake. Also reports RLQ chronic abdominal pain. Following doctor about that pain.

## 2023-09-03 ENCOUNTER — HOSPITAL ENCOUNTER (EMERGENCY)
Facility: HOSPITAL | Age: 68
Discharge: HOME | End: 2023-09-03
Attending: EMERGENCY MEDICINE
Payer: MEDICARE

## 2023-09-03 ENCOUNTER — APPOINTMENT (OUTPATIENT)
Dept: CT IMAGING | Facility: HOSPITAL | Age: 68
End: 2023-09-03
Attending: EMERGENCY MEDICINE
Payer: MEDICARE

## 2023-09-03 VITALS
HEIGHT: 71 IN | RESPIRATION RATE: 16 BRPM | BODY MASS INDEX: 32.9 KG/M2 | OXYGEN SATURATION: 95 % | HEART RATE: 75 BPM | SYSTOLIC BLOOD PRESSURE: 153 MMHG | TEMPERATURE: 99 F | WEIGHT: 235 LBS | DIASTOLIC BLOOD PRESSURE: 83 MMHG

## 2023-09-03 DIAGNOSIS — R10.9 ABDOMINAL PAIN OF UNKNOWN ETIOLOGY: Primary | ICD-10-CM

## 2023-09-03 LAB
ALBUMIN SERPL-MCNC: 3.6 G/DL (ref 3.4–5)
ALBUMIN/GLOB SERPL: 0.9 {RATIO} (ref 1–2)
ALP LIVER SERPL-CCNC: 83 U/L
ALT SERPL-CCNC: 35 U/L
ANION GAP SERPL CALC-SCNC: 5 MMOL/L (ref 0–18)
AST SERPL-CCNC: 40 U/L (ref 15–37)
BASOPHILS # BLD AUTO: 0.1 X10(3) UL (ref 0–0.2)
BASOPHILS NFR BLD AUTO: 1.3 %
BILIRUB SERPL-MCNC: 0.4 MG/DL (ref 0.1–2)
BILIRUB UR QL STRIP.AUTO: NEGATIVE
BUN BLD-MCNC: 15 MG/DL (ref 7–18)
CALCIUM BLD-MCNC: 8.6 MG/DL (ref 8.5–10.1)
CHLORIDE SERPL-SCNC: 108 MMOL/L (ref 98–112)
CLARITY UR REFRACT.AUTO: CLEAR
CO2 SERPL-SCNC: 26 MMOL/L (ref 21–32)
COLOR UR AUTO: COLORLESS
CREAT BLD-MCNC: 1.07 MG/DL
EGFRCR SERPLBLD CKD-EPI 2021: 75 ML/MIN/1.73M2 (ref 60–?)
EOSINOPHIL # BLD AUTO: 0.25 X10(3) UL (ref 0–0.7)
EOSINOPHIL NFR BLD AUTO: 3.2 %
ERYTHROCYTE [DISTWIDTH] IN BLOOD BY AUTOMATED COUNT: 13.2 %
GLOBULIN PLAS-MCNC: 3.9 G/DL (ref 2.8–4.4)
GLUCOSE BLD-MCNC: 141 MG/DL (ref 70–99)
GLUCOSE UR STRIP.AUTO-MCNC: NORMAL MG/DL
HCT VFR BLD AUTO: 41.1 %
HGB BLD-MCNC: 14.2 G/DL
IMM GRANULOCYTES # BLD AUTO: 0.01 X10(3) UL (ref 0–1)
IMM GRANULOCYTES NFR BLD: 0.1 %
KETONES UR STRIP.AUTO-MCNC: NEGATIVE MG/DL
LEUKOCYTE ESTERASE UR QL STRIP.AUTO: NEGATIVE
LIPASE SERPL-CCNC: 47 U/L (ref 13–75)
LYMPHOCYTES # BLD AUTO: 2.7 X10(3) UL (ref 1–4)
LYMPHOCYTES NFR BLD AUTO: 35 %
MCH RBC QN AUTO: 28.2 PG (ref 26–34)
MCHC RBC AUTO-ENTMCNC: 34.5 G/DL (ref 31–37)
MCV RBC AUTO: 81.5 FL
MONOCYTES # BLD AUTO: 0.96 X10(3) UL (ref 0.1–1)
MONOCYTES NFR BLD AUTO: 12.4 %
NEUTROPHILS # BLD AUTO: 3.7 X10 (3) UL (ref 1.5–7.7)
NEUTROPHILS # BLD AUTO: 3.7 X10(3) UL (ref 1.5–7.7)
NEUTROPHILS NFR BLD AUTO: 48 %
NITRITE UR QL STRIP.AUTO: NEGATIVE
OSMOLALITY SERPL CALC.SUM OF ELEC: 291 MOSM/KG (ref 275–295)
PH UR STRIP.AUTO: 5.5 [PH] (ref 5–8)
PLATELET # BLD AUTO: 229 10(3)UL (ref 150–450)
POTASSIUM SERPL-SCNC: 3.5 MMOL/L (ref 3.5–5.1)
PROT SERPL-MCNC: 7.5 G/DL (ref 6.4–8.2)
PROT UR STRIP.AUTO-MCNC: NEGATIVE MG/DL
RBC # BLD AUTO: 5.04 X10(6)UL
RBC UR QL AUTO: NEGATIVE
SODIUM SERPL-SCNC: 139 MMOL/L (ref 136–145)
SP GR UR STRIP.AUTO: <1.005 (ref 1–1.03)
UROBILINOGEN UR STRIP.AUTO-MCNC: NORMAL MG/DL
WBC # BLD AUTO: 7.7 X10(3) UL (ref 4–11)

## 2023-09-03 PROCEDURE — 80053 COMPREHEN METABOLIC PANEL: CPT

## 2023-09-03 PROCEDURE — 36415 COLL VENOUS BLD VENIPUNCTURE: CPT

## 2023-09-03 PROCEDURE — 99284 EMERGENCY DEPT VISIT MOD MDM: CPT

## 2023-09-03 PROCEDURE — 74177 CT ABD & PELVIS W/CONTRAST: CPT | Performed by: EMERGENCY MEDICINE

## 2023-09-03 PROCEDURE — 83690 ASSAY OF LIPASE: CPT

## 2023-09-03 PROCEDURE — 80053 COMPREHEN METABOLIC PANEL: CPT | Performed by: EMERGENCY MEDICINE

## 2023-09-03 PROCEDURE — 83690 ASSAY OF LIPASE: CPT | Performed by: EMERGENCY MEDICINE

## 2023-09-03 PROCEDURE — 99285 EMERGENCY DEPT VISIT HI MDM: CPT

## 2023-09-03 PROCEDURE — 85025 COMPLETE CBC W/AUTO DIFF WBC: CPT

## 2023-09-03 PROCEDURE — 85025 COMPLETE CBC W/AUTO DIFF WBC: CPT | Performed by: EMERGENCY MEDICINE

## 2023-09-03 PROCEDURE — 81003 URINALYSIS AUTO W/O SCOPE: CPT | Performed by: EMERGENCY MEDICINE

## 2023-09-03 RX ORDER — HYDROCODONE BITARTRATE AND ACETAMINOPHEN 5; 325 MG/1; MG/1
1-2 TABLET ORAL EVERY 6 HOURS PRN
Qty: 20 TABLET | Refills: 0 | Status: SHIPPED | OUTPATIENT
Start: 2023-09-03 | End: 2023-09-08

## 2023-09-03 NOTE — ED INITIAL ASSESSMENT (HPI)
Pt arrives to the ED with c/o of lower abdominal pain, pt denies n/v/d, pt states he feels clammy and hot. Pt states he had hernia surgery in 2021 and has mesh is his LLQ from the surgery and pt states he had a mass removed from is R kidney in 2021.

## 2025-03-02 ENCOUNTER — APPOINTMENT (OUTPATIENT)
Dept: GENERAL RADIOLOGY | Facility: HOSPITAL | Age: 70
End: 2025-03-02
Attending: EMERGENCY MEDICINE
Payer: MEDICARE

## 2025-03-02 ENCOUNTER — HOSPITAL ENCOUNTER (OUTPATIENT)
Facility: HOSPITAL | Age: 70
Setting detail: OBSERVATION
Discharge: HOME | End: 2025-03-03
Attending: EMERGENCY MEDICINE | Admitting: INTERNAL MEDICINE
Payer: MEDICARE

## 2025-03-02 DIAGNOSIS — R07.9 CHEST PAIN WITH MODERATE RISK FOR CARDIAC ETIOLOGY: Primary | ICD-10-CM

## 2025-03-02 PROBLEM — R73.9 HYPERGLYCEMIA: Status: ACTIVE | Noted: 2025-03-02

## 2025-03-02 LAB
ALBUMIN SERPL-MCNC: 4.7 G/DL (ref 3.2–4.8)
ALBUMIN/GLOB SERPL: 1.7 {RATIO} (ref 1–2)
ALP LIVER SERPL-CCNC: 76 U/L
ALT SERPL-CCNC: 17 U/L
ANION GAP SERPL CALC-SCNC: 7 MMOL/L (ref 0–18)
AST SERPL-CCNC: 33 U/L (ref ?–34)
BASOPHILS # BLD AUTO: 0.11 X10(3) UL (ref 0–0.2)
BASOPHILS NFR BLD AUTO: 1.4 %
BILIRUB SERPL-MCNC: 0.4 MG/DL (ref 0.2–1.1)
BUN BLD-MCNC: 12 MG/DL (ref 9–23)
CALCIUM BLD-MCNC: 9.6 MG/DL (ref 8.7–10.6)
CHLORIDE SERPL-SCNC: 106 MMOL/L (ref 98–112)
CO2 SERPL-SCNC: 28 MMOL/L (ref 21–32)
CREAT BLD-MCNC: 1.1 MG/DL
EGFRCR SERPLBLD CKD-EPI 2021: 72 ML/MIN/1.73M2 (ref 60–?)
EOSINOPHIL # BLD AUTO: 0.18 X10(3) UL (ref 0–0.7)
EOSINOPHIL NFR BLD AUTO: 2.2 %
ERYTHROCYTE [DISTWIDTH] IN BLOOD BY AUTOMATED COUNT: 13.1 %
GLOBULIN PLAS-MCNC: 2.8 G/DL (ref 2–3.5)
GLUCOSE BLD-MCNC: 154 MG/DL (ref 70–99)
HCT VFR BLD AUTO: 42.2 %
HGB BLD-MCNC: 14.5 G/DL
IMM GRANULOCYTES # BLD AUTO: 0.02 X10(3) UL (ref 0–1)
IMM GRANULOCYTES NFR BLD: 0.2 %
LYMPHOCYTES # BLD AUTO: 2.5 X10(3) UL (ref 1–4)
LYMPHOCYTES NFR BLD AUTO: 30.8 %
MCH RBC QN AUTO: 28.5 PG (ref 26–34)
MCHC RBC AUTO-ENTMCNC: 34.4 G/DL (ref 31–37)
MCV RBC AUTO: 82.9 FL
MONOCYTES # BLD AUTO: 0.84 X10(3) UL (ref 0.1–1)
MONOCYTES NFR BLD AUTO: 10.3 %
NEUTROPHILS # BLD AUTO: 4.48 X10 (3) UL (ref 1.5–7.7)
NEUTROPHILS # BLD AUTO: 4.48 X10(3) UL (ref 1.5–7.7)
NEUTROPHILS NFR BLD AUTO: 55.1 %
OSMOLALITY SERPL CALC.SUM OF ELEC: 295 MOSM/KG (ref 275–295)
PLATELET # BLD AUTO: 217 10(3)UL (ref 150–450)
POTASSIUM SERPL-SCNC: 4 MMOL/L (ref 3.5–5.1)
PROT SERPL-MCNC: 7.5 G/DL (ref 5.7–8.2)
RBC # BLD AUTO: 5.09 X10(6)UL
SODIUM SERPL-SCNC: 141 MMOL/L (ref 136–145)
TROPONIN I SERPL HS-MCNC: 11 NG/L
TROPONIN I SERPL HS-MCNC: 9 NG/L
WBC # BLD AUTO: 8.1 X10(3) UL (ref 4–11)

## 2025-03-02 PROCEDURE — 93005 ELECTROCARDIOGRAM TRACING: CPT

## 2025-03-02 PROCEDURE — 80053 COMPREHEN METABOLIC PANEL: CPT | Performed by: EMERGENCY MEDICINE

## 2025-03-02 PROCEDURE — 36415 COLL VENOUS BLD VENIPUNCTURE: CPT

## 2025-03-02 PROCEDURE — 84484 ASSAY OF TROPONIN QUANT: CPT | Performed by: INTERNAL MEDICINE

## 2025-03-02 PROCEDURE — 99285 EMERGENCY DEPT VISIT HI MDM: CPT

## 2025-03-02 PROCEDURE — 85025 COMPLETE CBC W/AUTO DIFF WBC: CPT | Performed by: EMERGENCY MEDICINE

## 2025-03-02 PROCEDURE — 84484 ASSAY OF TROPONIN QUANT: CPT | Performed by: EMERGENCY MEDICINE

## 2025-03-02 PROCEDURE — 94660 CPAP INITIATION&MGMT: CPT

## 2025-03-02 PROCEDURE — 93010 ELECTROCARDIOGRAM REPORT: CPT

## 2025-03-02 PROCEDURE — 96372 THER/PROPH/DIAG INJ SC/IM: CPT

## 2025-03-02 PROCEDURE — 94799 UNLISTED PULMONARY SVC/PX: CPT

## 2025-03-02 PROCEDURE — 71045 X-RAY EXAM CHEST 1 VIEW: CPT | Performed by: EMERGENCY MEDICINE

## 2025-03-02 RX ORDER — HYDROMORPHONE HYDROCHLORIDE 1 MG/ML
0.5 INJECTION, SOLUTION INTRAMUSCULAR; INTRAVENOUS; SUBCUTANEOUS EVERY 30 MIN PRN
Status: ACTIVE | OUTPATIENT
Start: 2025-03-02 | End: 2025-03-02

## 2025-03-02 RX ORDER — ATORVASTATIN CALCIUM 20 MG/1
20 TABLET, FILM COATED ORAL NIGHTLY
Status: DISCONTINUED | OUTPATIENT
Start: 2025-03-02 | End: 2025-03-03

## 2025-03-02 RX ORDER — TAMSULOSIN HYDROCHLORIDE 0.4 MG/1
0.4 CAPSULE ORAL DAILY
Status: DISCONTINUED | OUTPATIENT
Start: 2025-03-03 | End: 2025-03-03

## 2025-03-02 RX ORDER — MAGNESIUM 200 MG
1 TABLET ORAL DAILY
COMMUNITY

## 2025-03-02 RX ORDER — ASPIRIN 81 MG/1
324 TABLET, CHEWABLE ORAL ONCE
Status: COMPLETED | OUTPATIENT
Start: 2025-03-02 | End: 2025-03-02

## 2025-03-02 RX ORDER — ACETAMINOPHEN 500 MG
500 TABLET ORAL EVERY 4 HOURS PRN
Status: DISCONTINUED | OUTPATIENT
Start: 2025-03-02 | End: 2025-03-03

## 2025-03-02 RX ORDER — TAMSULOSIN HYDROCHLORIDE 0.4 MG/1
1 CAPSULE ORAL AS DIRECTED
COMMUNITY
Start: 2024-12-12

## 2025-03-02 RX ORDER — ONDANSETRON 2 MG/ML
4 INJECTION INTRAMUSCULAR; INTRAVENOUS EVERY 4 HOURS PRN
Status: ACTIVE | OUTPATIENT
Start: 2025-03-02 | End: 2025-03-02

## 2025-03-02 RX ORDER — LISINOPRIL 10 MG/1
10 TABLET ORAL DAILY
Status: DISCONTINUED | OUTPATIENT
Start: 2025-03-03 | End: 2025-03-03

## 2025-03-02 RX ORDER — PANTOPRAZOLE SODIUM 40 MG/1
40 TABLET, DELAYED RELEASE ORAL
Status: DISCONTINUED | OUTPATIENT
Start: 2025-03-03 | End: 2025-03-03

## 2025-03-02 RX ORDER — HEPARIN SODIUM 5000 [USP'U]/ML
5000 INJECTION, SOLUTION INTRAVENOUS; SUBCUTANEOUS EVERY 8 HOURS SCHEDULED
Status: DISCONTINUED | OUTPATIENT
Start: 2025-03-02 | End: 2025-03-03

## 2025-03-02 NOTE — ED PROVIDER NOTES
Patient Seen in: [facility] Emergency Department      History     Chief Complaint   Patient presents with    Chest Pain Angina     Stated Complaint: Chest pain    Subjective:   HPI  69-year-old male presents to the Emergency Department complaint having 4 days of intermittent chest discomfort.  The patient says that he was shoveling when the pain initially started.  The pain radiates to the right chest and into the right neck.  He is noticing some shortness of breath earlier today.  He also noticed that symptoms seem to come on when he was sitting at rest today in [employer].  He was slightly diaphoretic.  Reviewing his records he had an outpatient visit on 9/24/2024 for basal cell carcinoma.  He says he is never had any cardiac workup.  He does have high blood pressure and high cholesterol.  He is prediabetic.        Objective:     Past Medical History:    Back problem    lumbar    BPH (benign prostatic hyperplasia)    Diverticulitis    s/p colon resection    Diverticulosis of colon (without mention of hemorrhage)    Essential hypertension    GERD (gastroesophageal reflux disease)    Hearing impairment    right ear - resolved after surgery (5-2017) - excision cholesteatoma    High blood pressure    High cholesterol    History of 2019 novel coronavirus disease (COVID-19)    Had diarrhea, fatigue, SOB and low Sp02. Hospitalized in  11/18 - 11/22/2020.    History of blood transfusion    was given plasma    HOSPITALIZATIONS    diverticulitis    IMPOTENCE    OSTEOARTHRITIS    knees    Osteoarthrosis, unspecified whether generalized or localized, unspecified site    hands, knees    Paresthesia of left foot    Pre-diabetes    Renal disorder    small mass in right kidney, being monitored by Dr. Aleman    Renal oncocytoma of right kidney    s/p removal    Visual impairment    glasses              Past Surgical History:   Procedure Laterality Date    Back surgery  3/31/14    L4-S1 TLIF with excision of epidural mass  with open tx of lumbar fracture    Colon surgery  , [place]    s/p laparoscopic low anterior colon resection (diverticulosis); Dr Lincoln    Colonoscopy  , [facility]    diverticulitis, divertic cyst, Dr Cordoba    Hernia surgery  2021    Robot-assisted laparoscopic repair right inguinal hernia with mesh    Knee replacement surgery  10/2013    left    Knee replacement surgery  13    Right TKA by Dr. Lombardi    Middle ear surgery proc unlisted Right     excision cholesteatoma    Other Right     s/p removal right kidney mass (oncocytoma)    Other surgical history      s/p reversal of vasectomy    Other surgical history  2021    Right Nephrectomy - Dr. Aleman    Total knee replacement      left total knee arthroplasty     Upper gi endoscopy - referral  , [facility]    hiatal hernia, Dr Cordoba    Vasectomy                  Social History     Socioeconomic History    Marital status:    Tobacco Use    Smoking status: Former     Current packs/day: 0.00     Average packs/day: 1.5 packs/day for 6.0 years (9.0 ttl pk-yrs)     Types: Cigarettes     Start date: 3/2/1974     Quit date: 3/2/1980     Years since quittin.0    Smokeless tobacco: Never   Vaping Use    Vaping status: Never Used   Substance and Sexual Activity    Alcohol use: Yes     Alcohol/week: 2.0 standard drinks of alcohol     Types: 2 Shots of liquor per week     Comment: occas    Drug use: No                  Physical Exam     ED Triage Vitals   BP 25 1427 148/80   Pulse 25 1427 83   Resp 25 1427 18   Temp 25 1430 98 °F (36.7 °C)   Temp src --    SpO2 25 1427 95 %   O2 Device 25 1427 None (Room air)       Current Vitals:   Vital Signs  BP: 148/80  Pulse: 78  Resp: (!) 30  Temp: 98 °F (36.7 °C)  MAP (mmHg): 99    Oxygen Therapy  SpO2: 95 %  O2 Device: None (Room air)        Physical Exam  General: This a pleasant nontoxic appearing patient in no apparent  distress alert and oriented ×3  HEENT: Pupils are equal reactive to light.  Extra ocular motions are intact.  No scleral icterus or conjunctival pallor: Neck is supple without tenderness on palpation.  Head is atraumatic normocephalic.  Oral mucosa moist.  Tongue is midline.  No posterior pharyngeal lesions.   Lungs: Clear to auscultation bilaterally.  No wheezes, rhonchi, or rales appreciated.  No accessory muscle use noted for breathing.  Cardiac: Regular rate and rhythm.  Normal S1 and 2 without murmurs or ectopy appreciated  Abdomen: Soft on examination without tenderness to deep palpation or to percussion.  No masses appreciated.  Bowel sounds are normoactive.  No CVA tenderness.  Extremities: No cyanosis, no edema or clubbing.  Pulses are +2.  Full range of motion is noted of the extremities without deformities.  No tenderness.  Neurologically intact.    ED Course     Labs Reviewed   COMP METABOLIC PANEL (14) - Abnormal; Notable for the following components:       Result Value    Glucose 154 (*)     All other components within normal limits   TROPONIN I HIGH SENSITIVITY - Normal   CBC WITH DIFFERENTIAL WITH PLATELET   RAINBOW DRAW LAVENDER   RAINBOW DRAW LIGHT GREEN   RAINBOW DRAW BLUE     EKG    Rate, intervals and axes as noted on EKG Report.  Rate: 80   Rhythm: Sinus Rhythm  Reading: Normal sinus rhythm normal EKG         Narrative  PROCEDURE:  XR CHEST AP PORTABLE  (CPT=71045)     TECHNIQUE:  AP chest radiograph was obtained.     COMPARISON:  RAGHU DESHPANDE, XR CHEST PA + LAT CHEST (CPT=71046), 1/09/2021, 10:14 AM.     INDICATIONS:  Chest pain     PATIENT STATED HISTORY: (As transcribed by Technologist)  Patient states he has chest pain.         FINDINGS:  The cardiomediastinal silhouette is within normal limits.  There is no consolidation, effusion, or pneumothorax.  No aggressive osseous lesions are identified.                  Impression  CONCLUSION: No acute cardiopulmonary abnormality.         LOCATION:  [place]                 Dictated by (CST): Hubert Winn MD on 3/02/2025 at 3:22 PM      Finalized by (CST): Hubert Winn MD on 3/02/2025 at 3:23 PM                MDM    Differential diagnose include but not limited to cardiac ischemia, pneumothorax, pneumonia.  Laboratories are sent.  Chest x-ray performed  Troponin was normal glucose of 154 the rest of the metabolic was normal.  CBC was normal.  Admission disposition: 3/2/2025  3:46 PM       I reviewed the x-rays and agree with the radiologist report that showed no acute cardiopulmonary abnormality.    Heart Score:    HEART Score      Title      Criteria Score   Age: 65 and older Age Score: 2   History: Highly Suspicious Hx Score: 2     EKG: Normal EKG Score: 0   HTN: Yes   Hypercholesterolemia: Yes   Atherosclerosis/PVD: No     DM: No   BMI>30kg/m2: No   Smoking: No   Family History: No         Other Risk Factor Score: 2             Lab Results   Component Value Date    TROP <0.045 11/18/2020    TROPHS 9 03/02/2025           HEART Score: 5        Risk of adverse cardiac event is 12-16.6%             Heart score is a 5.  Patient will need hospitalization for continued monitoring.  I spoke to the hospital service as well as cardiology.  Patient was admitted for further care.  Medical Decision Making      Disposition and Plan     Clinical Impression:  1. Chest pain with moderate risk for cardiac etiology         Disposition:  Admit  3/2/2025  3:46 pm    Follow-up:  No follow-up provider specified.        Medications Prescribed:  Current Discharge Medication List              Supplementary Documentation:         Hospital Problems       Present on Admission  Date Reviewed: 2/17/2022            ICD-10-CM Noted POA    * (Principal) Chest pain with moderate risk for cardiac etiology R07.9 3/2/2025 Unknown    Hyperglycemia R73.9 3/2/2025 Yes

## 2025-03-02 NOTE — ED QUICK NOTES
Orders for admission, patient is aware of plan and ready to go upstairs. Any questions, please call ED RN RODDY at extension [phone].     Patient Covid vaccination status: Fully vaccinated     COVID Test Ordered in ED: None    COVID Suspicion at Admission: N/A    Running Infusions:  None    Mental Status/LOC at time of transport: A/O 4    Other pertinent information:   CIWA score: N/A   NIH score:  N/A

## 2025-03-03 ENCOUNTER — APPOINTMENT (OUTPATIENT)
Dept: CV DIAGNOSTICS | Facility: HOSPITAL | Age: 70
End: 2025-03-03
Attending: INTERNAL MEDICINE
Payer: MEDICARE

## 2025-03-03 VITALS
SYSTOLIC BLOOD PRESSURE: 110 MMHG | BODY MASS INDEX: 33 KG/M2 | HEIGHT: 71 IN | WEIGHT: 235.69 LBS | TEMPERATURE: 98 F | HEART RATE: 72 BPM | DIASTOLIC BLOOD PRESSURE: 57 MMHG | OXYGEN SATURATION: 92 % | RESPIRATION RATE: 18 BRPM

## 2025-03-03 LAB
ANION GAP SERPL CALC-SCNC: 6 MMOL/L (ref 0–18)
ATRIAL RATE: 80 BPM
BASOPHILS # BLD AUTO: 0.11 X10(3) UL (ref 0–0.2)
BASOPHILS NFR BLD AUTO: 1.8 %
BUN BLD-MCNC: 14 MG/DL (ref 9–23)
CALCIUM BLD-MCNC: 9.3 MG/DL (ref 8.7–10.6)
CHLORIDE SERPL-SCNC: 106 MMOL/L (ref 98–112)
CO2 SERPL-SCNC: 29 MMOL/L (ref 21–32)
CREAT BLD-MCNC: 1.02 MG/DL
EGFRCR SERPLBLD CKD-EPI 2021: 79 ML/MIN/1.73M2 (ref 60–?)
EOSINOPHIL # BLD AUTO: 0.19 X10(3) UL (ref 0–0.7)
EOSINOPHIL NFR BLD AUTO: 3.2 %
ERYTHROCYTE [DISTWIDTH] IN BLOOD BY AUTOMATED COUNT: 13.2 %
GLUCOSE BLD-MCNC: 115 MG/DL (ref 70–99)
HCT VFR BLD AUTO: 40.7 %
HGB BLD-MCNC: 13.6 G/DL
IMM GRANULOCYTES # BLD AUTO: 0.01 X10(3) UL (ref 0–1)
IMM GRANULOCYTES NFR BLD: 0.2 %
LYMPHOCYTES # BLD AUTO: 2.02 X10(3) UL (ref 1–4)
LYMPHOCYTES NFR BLD AUTO: 33.7 %
MAGNESIUM SERPL-MCNC: 1.8 MG/DL (ref 1.6–2.6)
MCH RBC QN AUTO: 27.7 PG (ref 26–34)
MCHC RBC AUTO-ENTMCNC: 33.4 G/DL (ref 31–37)
MCV RBC AUTO: 82.9 FL
MONOCYTES # BLD AUTO: 0.75 X10(3) UL (ref 0.1–1)
MONOCYTES NFR BLD AUTO: 12.5 %
NEUTROPHILS # BLD AUTO: 2.92 X10 (3) UL (ref 1.5–7.7)
NEUTROPHILS # BLD AUTO: 2.92 X10(3) UL (ref 1.5–7.7)
NEUTROPHILS NFR BLD AUTO: 48.6 %
OSMOLALITY SERPL CALC.SUM OF ELEC: 293 MOSM/KG (ref 275–295)
P AXIS: 5 DEGREES
P-R INTERVAL: 142 MS
PLATELET # BLD AUTO: 208 10(3)UL (ref 150–450)
POTASSIUM SERPL-SCNC: 4.1 MMOL/L (ref 3.5–5.1)
Q-T INTERVAL: 376 MS
QRS DURATION: 86 MS
QTC CALCULATION (BEZET): 433 MS
R AXIS: -7 DEGREES
RBC # BLD AUTO: 4.91 X10(6)UL
SODIUM SERPL-SCNC: 141 MMOL/L (ref 136–145)
T AXIS: 2 DEGREES
TROPONIN I SERPL HS-MCNC: 11 NG/L
VENTRICULAR RATE: 80 BPM
WBC # BLD AUTO: 6 X10(3) UL (ref 4–11)

## 2025-03-03 PROCEDURE — 78452 HT MUSCLE IMAGE SPECT MULT: CPT | Performed by: INTERNAL MEDICINE

## 2025-03-03 PROCEDURE — 93306 TTE W/DOPPLER COMPLETE: CPT | Performed by: INTERNAL MEDICINE

## 2025-03-03 PROCEDURE — 85025 COMPLETE CBC W/AUTO DIFF WBC: CPT | Performed by: INTERNAL MEDICINE

## 2025-03-03 PROCEDURE — 93018 CV STRESS TEST I&R ONLY: CPT | Performed by: INTERNAL MEDICINE

## 2025-03-03 PROCEDURE — 80048 BASIC METABOLIC PNL TOTAL CA: CPT | Performed by: INTERNAL MEDICINE

## 2025-03-03 PROCEDURE — 84484 ASSAY OF TROPONIN QUANT: CPT | Performed by: INTERNAL MEDICINE

## 2025-03-03 PROCEDURE — 83735 ASSAY OF MAGNESIUM: CPT | Performed by: INTERNAL MEDICINE

## 2025-03-03 PROCEDURE — 93017 CV STRESS TEST TRACING ONLY: CPT | Performed by: INTERNAL MEDICINE

## 2025-03-03 PROCEDURE — 96372 THER/PROPH/DIAG INJ SC/IM: CPT

## 2025-03-03 RX ORDER — ASPIRIN 81 MG/1
81 TABLET ORAL DAILY
Status: DISCONTINUED | OUTPATIENT
Start: 2025-03-03 | End: 2025-03-03

## 2025-03-03 RX ORDER — MAGNESIUM OXIDE 400 MG/1
400 TABLET ORAL ONCE
Status: COMPLETED | OUTPATIENT
Start: 2025-03-03 | End: 2025-03-03

## 2025-03-03 RX ORDER — REGADENOSON 0.08 MG/ML
INJECTION, SOLUTION INTRAVENOUS
Status: COMPLETED
Start: 2025-03-03 | End: 2025-03-03

## 2025-03-03 RX ORDER — ASPIRIN 81 MG/1
81 TABLET ORAL DAILY
Qty: 30 TABLET | Refills: 0 | Status: SHIPPED | OUTPATIENT
Start: 2025-03-04

## 2025-03-03 NOTE — PLAN OF CARE
Patient alert and oriented x 4. On room air.cpap during sleep, sinus on cardiac monitor. Patient denies any chest pain or chest discomfort at this time. Patient voids, last BM 3/2. Echo and troponin monitoring, v/s good afebrile,. Plan of care updated, all questions answered. Safety precautions in place. Bed alarm on. Will continue to monitor tele/labs/vital signs closely.     Plan:   Cardiology to see  Trend trops, Echo   Problem: DISCHARGE PLANNING  Goal: Discharge to home or other facility with appropriate resources  Description: INTERVENTIONS:  - Identify barriers to discharge w/pt and caregiver  - Include patient/family/discharge partner in discharge planning  - Arrange for needed discharge resources and transportation as appropriate  - Identify discharge learning needs (meds, wound care, etc)  - Arrange for interpreters to assist at discharge as needed  - Consider post-discharge preferences of patient/family/discharge partner  - Complete POLST form as appropriate  - Assess patient's ability to be responsible for managing their own health  - Refer to Case Management Department for coordinating discharge planning if the patient needs post-hospital services based on physician/LIP order or complex needs related to functional status, cognitive ability or social support system  Outcome: Progressing     Problem: Patient/Family Goals  Goal: Patient/Family Long Term Goal  Description: Patient's Long Term Goal: Discharge home    Interventions:  - Follow plan of care  - Collaborate with healthcare team  - See additional Care Plan goals for specific interventions  Outcome: Progressing  Goal: Patient/Family Short Term Goal  Description: Patient's Short Term Goal: Manage chest pain  Interventions:   - PRN meds per mar  - cards consult  - See additional Care Plan goals for specific interventions  Outcome: Progressing     Problem: CARDIOVASCULAR - ADULT  Goal: Maintains optimal cardiac output and hemodynamic  stability  Description: INTERVENTIONS:  - Monitor vital signs, rhythm, and trends  - Monitor for bleeding, hypotension and signs of decreased cardiac output  - Evaluate effectiveness of vasoactive medications to optimize hemodynamic stability  - Monitor arterial and/or venous puncture sites for bleeding and/or hematoma  - Assess quality of pulses, skin color and temperature  - Assess for signs of decreased coronary artery perfusion - ex. Angina  - Evaluate fluid balance, assess for edema, trend weights  Outcome: Progressing  Goal: Absence of cardiac arrhythmias or at baseline  Description: INTERVENTIONS:  - Continuous cardiac monitoring, monitor vital signs, obtain 12 lead EKG if indicated  - Evaluate effectiveness of antiarrhythmic and heart rate control medications as ordered  - Initiate emergency measures for life threatening arrhythmias  - Monitor electrolytes and administer replacement therapy as ordered  Outcome: Progressing

## 2025-03-03 NOTE — PLAN OF CARE
NURSING ADMISSION NOTE      Patient admitted via Cart. Oriented to room.  Safety precautions initiated.  Bed in low position. Call light within reach.   Patient return demonstrated use of call light.  Admission navigator completed with patient and pts spouse at bedside.    Pt is A/Ox4. Vital signs wnl. On RA currently maintaining O2 sats > 92%. Reports mild constant upper CP radiating to the right neck and back. CP worse with activity, improves with rest. Declined any prn meds at this time. Denies any SOB, lightheadedness, dizziness or weakness. Standby assistance to the bathroom. Instructed to call prior to getting up. Fall precautions in place. Pt updated on the plan of care.    POC: Echo, cards to see patient, trend troponins      Problem: DISCHARGE PLANNING  Goal: Discharge to home or other facility with appropriate resources  Description: INTERVENTIONS:  - Identify barriers to discharge w/pt and caregiver  - Include patient/family/discharge partner in discharge planning  - Arrange for needed discharge resources and transportation as appropriate  - Identify discharge learning needs (meds, wound care, etc)  - Arrange for interpreters to assist at discharge as needed  - Consider post-discharge preferences of patient/family/discharge partner  - Complete POLST form as appropriate  - Assess patient's ability to be responsible for managing their own health  - Refer to Case Management Department for coordinating discharge planning if the patient needs post-hospital services based on physician/LIP order or complex needs related to functional status, cognitive ability or social support system  Outcome: Progressing     Problem: Patient/Family Goals  Goal: Patient/Family Long Term Goal  Description: Patient's Long Term Goal: Discharge home    Interventions:  - Follow plan of care  - Collaborate with healthcare team  - See additional Care Plan goals for specific interventions  Outcome: Progressing  Goal: Patient/Family  Short Term Goal  Description: Patient's Short Term Goal: Manage chest pain  Interventions:   - PRN meds per mar  - cards consult  - See additional Care Plan goals for specific interventions  Outcome: Progressing     Problem: CARDIOVASCULAR - ADULT  Goal: Maintains optimal cardiac output and hemodynamic stability  Description: INTERVENTIONS:  - Monitor vital signs, rhythm, and trends  - Monitor for bleeding, hypotension and signs of decreased cardiac output  - Evaluate effectiveness of vasoactive medications to optimize hemodynamic stability  - Monitor arterial and/or venous puncture sites for bleeding and/or hematoma  - Assess quality of pulses, skin color and temperature  - Assess for signs of decreased coronary artery perfusion - ex. Angina  - Evaluate fluid balance, assess for edema, trend weights  Outcome: Progressing  Goal: Absence of cardiac arrhythmias or at baseline  Description: INTERVENTIONS:  - Continuous cardiac monitoring, monitor vital signs, obtain 12 lead EKG if indicated  - Evaluate effectiveness of antiarrhythmic and heart rate control medications as ordered  - Initiate emergency measures for life threatening arrhythmias  - Monitor electrolytes and administer replacement therapy as ordered  Outcome: Progressing
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
## 2025-03-03 NOTE — PROGRESS NOTES
03/02/25 1755 03/02/25 1756 03/02/25 1758   Vital Signs   Pulse 68 75 97   Heart Rate Source Monitor Monitor Monitor   Resp 20 18 18   /73 131/72 124/78   BP Location Right arm Right arm Right arm   BP Method Automatic Automatic Automatic   Patient Position Lying Sitting Standing     See orthostatic BP above. Patient denied any lightheadedness or dizziness with position changes.

## 2025-03-03 NOTE — PLAN OF CARE
Assumed care at 0730; Pt A/o x 4, stable VS and with complaints of mild chest pain, PRN meds refused. Saturating well on RA; Plan of care discussed with patient. Echo completed; Stress test to be done. Educated on fall risk and use of call light. Belongings and call light within reach. Bed at lowest position and locked.     @1740: Pt cleared by attending and consults. Tele monitor and IV line removed. Discharge papers, education sheets and belongings sent home with patient.       Problem: DISCHARGE PLANNING  Goal: Discharge to home or other facility with appropriate resources  Description: INTERVENTIONS:  - Identify barriers to discharge w/pt and caregiver  - Include patient/family/discharge partner in discharge planning  - Arrange for needed discharge resources and transportation as appropriate  - Identify discharge learning needs (meds, wound care, etc)  - Arrange for interpreters to assist at discharge as needed  - Consider post-discharge preferences of patient/family/discharge partner  - Complete POLST form as appropriate  - Assess patient's ability to be responsible for managing their own health  - Refer to Case Management Department for coordinating discharge planning if the patient needs post-hospital services based on physician/LIP order or complex needs related to functional status, cognitive ability or social support system  Outcome: Progressing     Problem: Patient/Family Goals  Goal: Patient/Family Long Term Goal  Description: Patient's Long Term Goal: Discharge home    Interventions:  - Follow plan of care  - Collaborate with healthcare team  - See additional Care Plan goals for specific interventions  Outcome: Progressing  Goal: Patient/Family Short Term Goal  Description: Patient's Short Term Goal: Manage chest pain  Interventions:   - PRN meds per mar  - cards consult  - See additional Care Plan goals for specific interventions  Outcome: Progressing     Problem: CARDIOVASCULAR - ADULT  Goal:  Maintains optimal cardiac output and hemodynamic stability  Description: INTERVENTIONS:  - Monitor vital signs, rhythm, and trends  - Monitor for bleeding, hypotension and signs of decreased cardiac output  - Evaluate effectiveness of vasoactive medications to optimize hemodynamic stability  - Monitor arterial and/or venous puncture sites for bleeding and/or hematoma  - Assess quality of pulses, skin color and temperature  - Assess for signs of decreased coronary artery perfusion - ex. Angina  - Evaluate fluid balance, assess for edema, trend weights  Outcome: Progressing  Goal: Absence of cardiac arrhythmias or at baseline  Description: INTERVENTIONS:  - Continuous cardiac monitoring, monitor vital signs, obtain 12 lead EKG if indicated  - Evaluate effectiveness of antiarrhythmic and heart rate control medications as ordered  - Initiate emergency measures for life threatening arrhythmias  - Monitor electrolytes and administer replacement therapy as ordered  Outcome: Progressing

## 2025-03-03 NOTE — CONSULTS
[facility] Cardiology  Consultation Note      Perry Harrington Patient Status:  Observation    1955 MRN [id number]   Location [facility] 8NE-A Attending Jude Malhotra MD   Hosp Day # 0 PCP Dionisio Abad MD     Reason for consult: Chest pain    History of Present Illness:  Perry Harrington is a 69 year old male who presented to [facility] on 3/2/2025 with chest pain, somewhat atypical in that it involves the right side more than the left. There is some sharp sensation also on the left side this morning. Has noted over the last week, ass/w some MULLER. There are no reports of palpitations, leg swelling, orthopnea, PND, lightheadedness, syncope, claudication, stroke/TIA symptoms, or any outward signs of bleeding.           Medications:  Current Facility-Administered Medications   Medication Dose Route Frequency    influenza virus trivalent high dose PF (Fluzone HD) 0.5 mL injection (ages >/= 65 years) 0.5 mL  0.5 mL Intramuscular Prior to discharge    heparin (Porcine) 5000 UNIT/ML injection 5,000 Units  5,000 Units Subcutaneous Q8H ANITA    acetaminophen (Tylenol Extra Strength) tab 500 mg  500 mg Oral Q4H PRN    atorvastatin (Lipitor) tab 20 mg  20 mg Oral Nightly    pantoprazole (Protonix) DR tab 40 mg  40 mg Oral QAM AC    tamsulosin (Flomax) cap 0.4 mg  0.4 mg Oral Daily    lisinopril (Zestril) tab 10 mg  10 mg Oral Daily       Past Medical History:    Back problem    lumbar    BPH (benign prostatic hyperplasia)    Diverticulitis    s/p colon resection    Diverticulosis of colon (without mention of hemorrhage)    Essential hypertension    GERD (gastroesophageal reflux disease)    Hearing impairment    right ear - resolved after surgery () - excision cholesteatoma    High blood pressure    High cholesterol    History of 2019 novel coronavirus disease (COVID-19)    Had diarrhea, fatigue, SOB and low Sp02. Hospitalized in   - 2020.    History of blood transfusion     was given plasma    HOSPITALIZATIONS    diverticulitis    IMPOTENCE    OSTEOARTHRITIS    knees    Osteoarthrosis, unspecified whether generalized or localized, unspecified site    hands, knees    Paresthesia of left foot    Pre-diabetes    Renal disorder    small mass in right kidney, being monitored by Dr. Aleman    Renal oncocytoma of right kidney    s/p removal    Visual impairment    glasses       Past Surgical History:   Procedure Laterality Date    Back surgery  3/31/14    L4-S1 TLIF with excision of epidural mass with open tx of lumbar fracture    Colon surgery  , [facility]    s/p laparoscopic low anterior colon resection (diverticulosis); Dr Lincoln    Colonoscopy  2-2012, [facility]    diverticulitis, divertic cyst, Dr Cordoba    Hernia surgery  01/21/2021    Robot-assisted laparoscopic repair right inguinal hernia with mesh    Knee replacement surgery  10/2013    left    Knee replacement surgery  12/5/13    Right TKA by Dr. Lombardi    Middle ear surgery proc unlisted Right 5-2017    excision cholesteatoma    Other Right 2021    s/p removal right kidney mass (oncocytoma)    Other surgical history      s/p reversal of vasectomy    Other surgical history  07/27/2021    Right Nephrectomy - Dr. Aleman    Total knee replacement      left total knee arthroplasty     Upper gi endoscopy - referral  2-2012, [facility]    hiatal hernia, Dr Cordoba    Vasectomy         Family History  family history includes Cancer in his mother.    Social History   reports that he quit smoking about 45 years ago. His smoking use included cigarettes. He started smoking about 51 years ago. He has a 9 pack-year smoking history. He has never used smokeless tobacco. He reports current alcohol use of about 2.0 standard drinks of alcohol per week. He reports that he does not use drugs.     Allergies  Allergies[1]    Review of Systems:  As per HPI, otherwise 10 point ROS is negative in detail.    Physical Exam:  Blood  pressure 120/71, pulse 67, temperature 97.7 °F (36.5 °C), temperature source Oral, resp. rate 18, height 71\", weight 235 lb 11.2 oz (106.9 kg), SpO2 95%.  Temp (24hrs), Av.8 °F (36.6 °C), Min:97.6 °F (36.4 °C), Max:98.1 °F (36.7 °C)    Wt Readings from Last 3 Encounters:   25 235 lb 11.2 oz (106.9 kg)   23 235 lb (106.6 kg)   22 237 lb (107.5 kg)       General: Awake and alert; in no acute distress  HEENT: Extraocular movements are intact; sclerae are anicteric; scalp is atraumatic  Neck: Supple; no JVD; no carotid bruits  Cardiac: Regular rate and regular rhythm; normal S1 and S2, no murmurs, rubs, or gallops are appreciated  Lungs: Clear to auscultation bilaterally; no accessory muscle use is noted, no wheezes, rhonci or rales  Abdomen: Soft, non-distended, non-tender; bowel sounds are normoactive  Extremities: Warm, no edema, clubbing or cyanosis; moves all 4 extremities normally, distal pulses intact and equal  Psychiatric: Normal mood and affect; answers questions appropriately  Dermatologic: No rashes; normal skin turgor    Diagnostic testing:    Labs:   Lab Results   Component Value Date    PT 17.9 (H) 10/27/2013    PT 16.7 (H) 10/26/2013    INR 0.98 2022    INR 0.9 2014        Lab Results   Component Value Date    WBC 6.0 2025    HGB 13.6 2025    HCT 40.7 2025    .0 2025    CREATSERUM 1.02 2025    BUN 14 2025     2025    K 4.1 2025     2025    CO2 29.0 2025     2025    CA 9.3 2025    ALB 4.7 2025    ALKPHO 76 2025    BILT 0.4 2025    TP 7.5 2025    AST 33 2025    ALT 17 2025    MG 1.8 2025       Cardiac diagnostics:    EKG 3/3/2025: NSR, normal    Echo pending:    Impression:  69 year old male presenting with chest pain, troponin and EKG negative  HTN  HLD  GERD  BPH    Recommendations:  Check echo  Check Lexiscan MPI  ASA  daily  Continue home lisinopril, atorvastatin for now  Possible dc home if testing negative.     Thank you for allowing our practice to participate in the care of your patient. Please do not hesitate to contact me if you have any questions.    Duncan Herr MD  Interventional Cardiology  [facility]  Office: [phone]         [1] No Known Allergies

## 2025-03-03 NOTE — PROGRESS NOTES
[facility] Hospitalist Progress Note     PCP: Dionisio Abad MD    Chief Complaint: follow-up   Follow up for: The encounter diagnosis was Chest pain with moderate risk for cardiac etiology.    Overnight/Interim Events:      SUBJECTIVE:  Still some pain but better. Not as bad as yesterday. Feels it if moves. Took shower. Felt sharp pain under heart this am.     OBJECTIVE:  Temp:  [97.6 °F (36.4 °C)-98.1 °F (36.7 °C)] 97.7 °F (36.5 °C)  Pulse:  [60-97] 67  Resp:  [18-30] 18  BP: (109-148)/(66-80) 120/71  SpO2:  [91 %-97 %] 95 %    Intake/Output:    Intake/Output Summary (Last 24 hours) at 3/3/2025 1137  Last data filed at 3/3/2025 0834  Gross per 24 hour   Intake 300 ml   Output 0 ml   Net 300 ml       Last 3 Weights   03/02/25 1816 235 lb 11.2 oz (106.9 kg)   03/02/25 1758 235 lb 10.8 oz (106.9 kg)   03/02/25 1427 238 lb (108 kg)   09/03/23 1801 235 lb (106.6 kg)   12/01/22 2023 237 lb (107.5 kg)       Exam    General: Alert, no distress, appears stated age.     Head:  Normocephalic, without obvious abnormality, atraumatic.   Eyes:  Sclera anicteric, EOMs intact.    Nose: Nares normal,  Mucosa normal    Throat: Lips normal   Neck: Supple, symmetrical, trachea midline   Lungs:   Clear to auscultation bilaterally. Normal effort   Chest wall:  No tenderness or deformity   Heart:  Regular rate and rhythm, S1, S2 normal, no murmur, rub or gallop appreciated   Abdomen:   Soft, NT/ND, Bowel sounds normal. No masses,  No organomegaly.    Extremities: Extremities normal, atraumatic, no cyanosis or LE edema.   Skin: Skin color, texture, turgor normal. No rashes or lesions.    Neurologic: Moving all extremities spontaneously, no focal deficit appreciated      Data Review:       Labs:     Recent Labs   Lab 03/02/25  1438 03/03/25  0531   WBC 8.1 6.0   HGB 14.5 13.6   MCV 82.9 82.9   .0 208.0       Recent Labs   Lab 03/02/25  1438 03/03/25  0531    141   K 4.0 4.1    106   CO2 28.0 29.0   BUN 12 14   CREATSERUM  1.10 1.02   CA 9.6 9.3   MG  --  1.8   * 115*       Recent Labs   Lab 03/02/25  1438   ALT 17   AST 33   ALB 4.7       No results for input(s): \"PGLU\" in the last 168 hours.    No results for input(s): \"TROP\" in the last 168 hours.      Meds:      heparin  5,000 Units Subcutaneous Q8H ANITA    atorvastatin  20 mg Oral Nightly    pantoprazole  40 mg Oral QAM AC    tamsulosin  0.4 mg Oral Daily    lisinopril  10 mg Oral Daily         influenza virus vaccine PF    acetaminophen       Assessment/Plan:     69 year old male with PMH including but not limited to HTN, HL, BPH, GERD who p/t [facility] ED c cp.     CP  - tele  - serial enzymes, first neg; EKG NSR  - CMP/CBC ok; CXR clear   - echo  - Cards c/s  apprec, lexican stress test given persistence of sxs and hx HTN/HL  - asa, added 81mg for dc     # HL  -statin     # HTN  - ace     # GERD  -PPI, wean as o/p if appropriate     # BPH  - flomax        # Proph  - sqh        Dispo: CTU, possible dc if neg --> reported neg stress, dc    Questions/concerns were discussed with patient and wife by bedside. D/w RN and Dr. Herr    Total Time spent with patient and coordinating care:  55 minutes    Jude Malhotra MD  [facility] Hospitalist  [phone]  3/3/2025  11:37 AM

## (undated) DEVICE — SUTURE ETHILON 2-0 FS

## (undated) DEVICE — GOWN,SIRUS,FABRIC-REINFORCED,X-LARGE: Brand: MEDLINE

## (undated) DEVICE — MINI-BLADE®: Brand: BEAVER®

## (undated) DEVICE — SUTURE MONOCRYL 5-0 P-3

## (undated) DEVICE — PETRI DISH 31-019

## (undated) DEVICE — RUBBER BAND STERILE

## (undated) DEVICE — MEGA SUTURECUT ND: Brand: ENDOWRIST

## (undated) DEVICE — SPONGE STICK WITH PVP-I: Brand: KENDALL

## (undated) DEVICE — SUTURE PROLENE 4-0 RB-1

## (undated) DEVICE — INSUFFLATION NEEDLE TO ESTABLISH PNEUMOPERITONEUM.: Brand: INSUFFLATION NEEDLE

## (undated) DEVICE — ELECTRODE 8227410 PAIRED 2 CH SET ROHS

## (undated) DEVICE — VIOLET BRAIDED (POLYGLACTIN 910), SYNTHETIC ABSORBABLE SUTURE: Brand: COATED VICRYL

## (undated) DEVICE — UNDYED BRAIDED (POLYGLACTIN 910), SYNTHETIC ABSORBABLE SUTURE: Brand: COATED VICRYL

## (undated) DEVICE — BLADE BEAVER EAR 7200

## (undated) DEVICE — BLADELESS OBTURATOR: Brand: WECK VISTA

## (undated) DEVICE — STRL PENROSE DRAIN 18" X 1/4": Brand: CARDINAL HEALTH

## (undated) DEVICE — WIPE WITH WICK AND CORNEAL SHIELD: Brand: MEROCEL

## (undated) DEVICE — ARM DRAPE

## (undated) DEVICE — MONOPOLAR CURVED SCISSORS: Brand: ENDOWRIST

## (undated) DEVICE — SUTURE VICRYL 0 UR-6

## (undated) DEVICE — DRESSING GLASSCOCK EAR ADULT

## (undated) DEVICE — PROGRASP FORCEPS: Brand: ENDOWRIST

## (undated) DEVICE — 3.0MM FINE DIAMOND, EXTENDED

## (undated) DEVICE — GLOVE SURG SENSICARE SZ 6-1/2

## (undated) DEVICE — ANGIO CATH 16GX2

## (undated) DEVICE — DERMABOND LIQUID ADHESIVE

## (undated) DEVICE — SUTURE MONOCRYL 4-0 PS-2

## (undated) DEVICE — LARGE NEEDLE DRIVER: Brand: ENDOWRIST

## (undated) DEVICE — 1 ML TUBERCULIN SYRINGE REGULAR TIP: Brand: MONOJECT

## (undated) DEVICE — TROCAR: Brand: KII FIOS FIRST ENTRY

## (undated) DEVICE — STERILE SYNTHETIC POLYISOPRENE POWDER-FREE SURGICAL GLOVES WITH HYDROGEL COATING, SMOOTH FINISH, STRAIGHT FINGER: Brand: PROTEXIS

## (undated) DEVICE — SYRINGE 10ML LL TIP

## (undated) DEVICE — COVER WAND RF DETECT

## (undated) DEVICE — STERILE POLYISOPRENE POWDER-FREE SURGICAL GLOVES: Brand: PROTEXIS

## (undated) DEVICE — ROBOTIC GENERAL: Brand: MEDLINE INDUSTRIES, INC.

## (undated) DEVICE — SOL H2O IV

## (undated) DEVICE — 1010 S-DRAPE TOWEL DRAPE 10/BX: Brand: STERI-DRAPE™

## (undated) DEVICE — 1.0MM TAPERED ROUND

## (undated) DEVICE — COLUMN DRAPE

## (undated) DEVICE — ANCHOR TISSUE RETRIEVAL SYSTEM, BAG SIZE 175 ML, PORT SIZE 10 MM: Brand: ANCHOR TISSUE RETRIEVAL SYSTEM

## (undated) DEVICE — PROBE 8225101 5PK STD PRASS FL TIP ROHS

## (undated) DEVICE — #15 STERILE STAINLESS BLADE: Brand: STERILE STAINLESS BLADES

## (undated) DEVICE — CANNULA SEAL

## (undated) DEVICE — 4.0MM ROUND FLUTED SOFT TOUCH

## (undated) DEVICE — FENESTRATED BIPOLAR FORCEPS: Brand: ENDOWRIST

## (undated) DEVICE — STANDARD HYPODERMIC NEEDLE,POLYPROPYLENE HUB: Brand: MONOJECT

## (undated) DEVICE — 5.0MM ROUND FLUTED SOFT TOUCH

## (undated) DEVICE — 3M™ STERI-DRAPE™ MEDIUM DRAPE WITH APERTURE 1030: Brand: STERI-DRAPE™

## (undated) DEVICE — TIP COVER ACCESSORY

## (undated) DEVICE — SOL  .9 1000ML BTL

## (undated) DEVICE — TRI-LUMEN FILTERED TUBE SET WITH ACTIVATED CHARCOAL FILTER: Brand: AIRSEAL

## (undated) DEVICE — SURGICEL SNOW ABS 4X4

## (undated) DEVICE — SUTURE VICRYL 0

## (undated) DEVICE — CLIP HEMOLOK LARGE PURPLE

## (undated) DEVICE — SOL  .9 1000ML BAG

## (undated) DEVICE — AIRSEAL 12 MM ACCESS PORT AND PALM GRIP OBTURATOR WITH BLADELESS OPTICAL TIP, 120 MM LENGTH: Brand: AIRSEAL

## (undated) DEVICE — VISUALIZATION SYSTEM: Brand: CLEARIFY

## (undated) DEVICE — SUTURE VLOC 90 3-0 9\" 0644

## (undated) DEVICE — PREMIUM WET SKIN PREP TRAY: Brand: MEDLINE INDUSTRIES, INC.

## (undated) DEVICE — Device

## (undated) DEVICE — NON-ADHERENT PAD PREPACK: Brand: TELFA

## (undated) DEVICE — EXOFIN TISSUE ADHESIVE 1.0ML

## (undated) DEVICE — 40580 - THE PINK PAD - ADVANCED TRENDELENBURG POSITIONING KIT: Brand: 40580 - THE PINK PAD - ADVANCED TRENDELENBURG POSITIONING KIT

## (undated) DEVICE — STRL PENROSE DRAIN 18" X 1/2": Brand: CARDINAL HEALTH

## (undated) DEVICE — HEAD AND NECK CDS-LF: Brand: MEDLINE INDUSTRIES, INC.

## (undated) DEVICE — SUTURE VLOC 90 2-0 9\" 2145

## (undated) DEVICE — DISPOSABLE IRRIGATION CASSETTE: Brand: CORE

## (undated) DEVICE — CAUTERY BLADE 2IN INS E1455

## (undated) DEVICE — SUTURE VICRYL 3-0 SH

## (undated) DEVICE — 2, DISPOSABLE SUCTION/IRRIGATOR WITHOUT DISPOSABLE TIP: Brand: STRYKEFLOW

## (undated) DEVICE — DRAPE MICROSCOPE LEICA

## (undated) DEVICE — TOWEL SURG OR 17X30IN BLUE

## (undated) DEVICE — KENDALL SCD EXPRESS SLEEVES, KNEE LENGTH, MEDIUM: Brand: KENDALL SCD

## (undated) NOTE — LETTER
OUTSIDE TESTING RESULT REQUEST     IMPORTANT: FOR YOUR IMMEDIATE ATTENTION  Please FAX all test results listed below to: [phone]         * * * * If testing is NOT complete, arrange with patient A.S.A.P. * * * *      Patient Name: Anisha Wayne

## (undated) NOTE — IP AVS SNAPSHOT
[facility] [place] [address] [place], [address] ~ [phone]                Discharge Summary   5/18/2017    [address]           Admission Information        Provider Department    5/18/2017 Stacy Oconnell MD  Pre-Op / A [    ]    [    ]    [    ]       Omeprazole 40 MG Cpdr        TAKE 1 CAPSULE BY MOUTH ONCE DAILY.     Alfred Russell     [    ]    [    ]    [    ]    [    ]       psyllium 28 % Pack   Commonly known as:  METAMUCIL SMOOTH TEXTURE        Take 1 packet by m or has concerns about the transfusion of blood, the surgeon should be informed. · A blood test may be required prior to surgery. · A visit to the primary care doctor is usually needed to make sure the patient is in good health at surgery.   · You will be · Injury or weakness of the facial nerve which controls movement to one side of the face may occur in about 1% of cases. · Spinal fluid leak or damage to the brain may can rarely occur.   · Recurrence of ear disease that may require further procedures or s drainage at the incision site, please report these findings to your doctor. KEEPING THE EAR CANAL DRY  While the ear heals, it is very important to keep the ear canal dry while showering or bathing.   There are several ways to protect the ear:  · Take a Expect to notice a temporary decrease in hearing or muffled hearing while the packing is in place. Additionally you may notice a temporary ringing or buzzing sound in the ear. DRAINAGE  The ear(s) may drain after surgery.  This drainage may be any color Redness, swelling, or increasing pain at the incision site  Hearing that worsens each day  Neck stiffness  Excessive bleeding    If you have any questions or concerns, please call our office, Monday through Friday, 9:00 a.m. to 5:00 p.m.  For after-hour olivia Thank you for letting our team care for you today   and for choosing [address] for your provider,   as we know you have many different options out there! Best wishes for a quick recovery!   Dwayne Maldonado and Sumaya Angelo RN's Post harming yourself, contact [facility] at [phone]. - If you don’t have insurance, Chris Henderson has partnered with [facility] [address] to help you get signed up for insurance coverage.   [facility] [place]

## (undated) NOTE — LETTER
[facility] Testing Department  Phone: [phone]  Right Fax: [phone]    ADDRESSEE INFORMATION: SENDER INFORMATION:   To:   Barber Avila MD From: Darion Richards RN     Department: Pre-Admission Testing   Fax Number: [phone]

## (undated) NOTE — LETTER
[address] FOR BLOOD OR BLOOD COMPONENTS      In the course of your treatment, it may become necessary to administer a transfusion of blood or blood components.  This form provides basic information concerning this proc If loss of blood poses serious threats in the course of your treatment, THERE IS NO EFFECTIVE ALTERNATIVE TO BLOOD TRANSFUSION.  However, if you have any further questions on this matter, your physician will fully explain the alternatives to you if it has n